# Patient Record
Sex: MALE | Race: WHITE
[De-identification: names, ages, dates, MRNs, and addresses within clinical notes are randomized per-mention and may not be internally consistent; named-entity substitution may affect disease eponyms.]

---

## 2017-01-10 ENCOUNTER — HOSPITAL ENCOUNTER (OUTPATIENT)
Dept: HOSPITAL 45 - C.LABMFLN | Age: 82
Discharge: HOME | End: 2017-01-10
Attending: FAMILY MEDICINE
Payer: COMMERCIAL

## 2017-01-10 DIAGNOSIS — Z00.00: Primary | ICD-10-CM

## 2017-01-10 LAB
APPEARANCE UR: CLEAR
BILIRUB UR-MCNC: (no result) MG/DL
COLOR UR: YELLOW
EOSINOPHIL NFR BLD AUTO: 182 K/UL (ref 130–400)
HCT VFR BLD CALC: 42.5 % (ref 42–52)
MANUAL MICROSCOPIC REQUIRED?: NO
MCH RBC QN AUTO: 28.3 PG (ref 25–34)
MCHC RBC AUTO-ENTMCNC: 32.7 G/DL (ref 32–36)
MCV RBC AUTO: 86.4 FL (ref 80–100)
NITRITE UR QL STRIP: (no result)
PH UR STRIP: 5.5 [PH] (ref 4.5–7.5)
PMV BLD AUTO: 11.1 FL (ref 7.4–10.4)
RBC # BLD AUTO: 4.92 M/UL (ref 4.7–6.1)
REVIEW REQ?: NO
SP GR UR STRIP: 1.02 (ref 1–1.03)
URINE BILL WITH OR WITHOUT MIC: (no result)
UROBILINOGEN UR-MCNC: (no result) MG/DL
WBC # BLD AUTO: 7.72 K/UL (ref 4.8–10.8)

## 2017-03-04 ENCOUNTER — HOSPITAL ENCOUNTER (EMERGENCY)
Dept: HOSPITAL 45 - C.EDB | Age: 82
Discharge: HOME | End: 2017-03-04
Payer: COMMERCIAL

## 2017-03-04 VITALS — TEMPERATURE: 97.88 F

## 2017-03-04 VITALS — SYSTOLIC BLOOD PRESSURE: 114 MMHG | OXYGEN SATURATION: 97 % | DIASTOLIC BLOOD PRESSURE: 70 MMHG | HEART RATE: 58 BPM

## 2017-03-04 VITALS
WEIGHT: 205.91 LBS | HEIGHT: 70 IN | BODY MASS INDEX: 29.48 KG/M2 | HEIGHT: 70 IN | WEIGHT: 205.91 LBS | BODY MASS INDEX: 29.48 KG/M2

## 2017-03-04 DIAGNOSIS — R10.11: Primary | ICD-10-CM

## 2017-03-04 DIAGNOSIS — M19.90: ICD-10-CM

## 2017-03-04 DIAGNOSIS — R12: ICD-10-CM

## 2017-03-04 LAB
ALP SERPL-CCNC: 65 U/L (ref 45–117)
ALT SERPL-CCNC: 46 U/L (ref 12–78)
ANION GAP SERPL CALC-SCNC: 8 MMOL/L (ref 3–11)
APPEARANCE UR: CLEAR
AST SERPL-CCNC: 25 U/L (ref 15–37)
BASOPHILS # BLD: 0.01 K/UL (ref 0–0.2)
BASOPHILS NFR BLD: 0.1 %
BILIRUB UR-MCNC: (no result) MG/DL
BUN SERPL-MCNC: 13 MG/DL (ref 7–18)
BUN/CREAT SERPL: 13.2 (ref 10–20)
CALCIUM SERPL-MCNC: 8.8 MG/DL (ref 8.5–10.1)
CHLORIDE SERPL-SCNC: 107 MMOL/L (ref 98–107)
CO2 SERPL-SCNC: 29 MMOL/L (ref 21–32)
COLOR UR: YELLOW
COMPLETE: YES
CREAT CL PREDICTED SERPL C-G-VRATE: 68.3 ML/MIN
CREAT SERPL-MCNC: 0.99 MG/DL (ref 0.6–1.4)
EOSINOPHIL NFR BLD AUTO: 214 K/UL (ref 130–400)
GLUCOSE SERPL-MCNC: 98 MG/DL (ref 70–99)
HCT VFR BLD CALC: 42.9 % (ref 42–52)
IG%: 0.1 %
IMM GRANULOCYTES NFR BLD AUTO: 32.5 %
INR PPP: 1 (ref 0.9–1.1)
LYMPHOCYTES # BLD: 2.22 K/UL (ref 1.2–3.4)
MANUAL MICROSCOPIC REQUIRED?: NO
MCH RBC QN AUTO: 27.2 PG (ref 25–34)
MCHC RBC AUTO-ENTMCNC: 31.7 G/DL (ref 32–36)
MCV RBC AUTO: 85.8 FL (ref 80–100)
MONOCYTES NFR BLD: 8.5 %
NEUTROPHILS # BLD AUTO: 1.8 %
NEUTROPHILS NFR BLD AUTO: 57 %
NITRITE UR QL STRIP: (no result)
PARTIAL THROMBOPLASTIN RATIO: 0.9
PH UR STRIP: 5.5 [PH] (ref 4.5–7.5)
PMV BLD AUTO: 9.9 FL (ref 7.4–10.4)
POTASSIUM SERPL-SCNC: 4 MMOL/L (ref 3.5–5.1)
PROTHROMBIN TIME: 10.2 SECONDS (ref 9–12)
RBC # BLD AUTO: 5 M/UL (ref 4.7–6.1)
REVIEW REQ?: NO
SODIUM SERPL-SCNC: 144 MMOL/L (ref 136–145)
SP GR UR STRIP: 1.02 (ref 1–1.03)
URINE BILL WITH OR WITHOUT MIC: (no result)
UROBILINOGEN UR-MCNC: (no result) MG/DL
WBC # BLD AUTO: 6.83 K/UL (ref 4.8–10.8)

## 2017-03-04 NOTE — EMERGENCY ROOM VISIT NOTE
History


Report prepared by Miguel:  Yaritza Zambrano


Under the Supervision of:  Dr. Kalen Salinas M.D.


First contact with patient:  11:41


Chief Complaint:  ABDOMINAL PAIN


Stated Complaint:  PAIN AND NAUSEA/STOMACH





History of Present Illness


The patient is an 81 year old male who presents to the Emergency Room with 

complaints of persistent mid abdominal pain that began last night around 

midnight.  He currently rates his discomfort as a 5/10 in severity, describing 

his discomfort as a pressure.  The patient states that he has had ongoing acid 

reflux problems for the past several years, but notes that his symptoms are 

much more severe recently.  He states that he takes Zantac and Protonix daily.  

The patient states that his pain is worsened with eating.  He additionally 

associates nausea and increased acid reflux with his symptoms.  The patient 

notes that for the last week and a half he has had loose, diarrhea stool.  He 

denies any recent antibiotic use.  The patient notes a family history of 

gallbladder disease, noting that his mother had a cholecystectomy in her 50s.  

He notes a history of a hernia repair, but denies any other abdominal 

surgeries.  The patient additionally notes that he has had a rash to the back 

of his legs.  The patient denies any chest pain, shortness of breath, fever 

vomiting, hematochezia, or melena.  He reports normal urination.





   Source of History:  patient


   Onset:  last night around midnight


   Position:  abdomen (mid)


   Symptom Intensity:  5/10


   Quality:  pressure


   Timing:  other (persistent)


   Modifying Factors (Worsening):  eating


   Associated Symptoms:  + nausea, + urinary symptoms (loose), No SOB, No chest 

pain, No fevers, No hematochezia, No melena, No vomiting


Note:


Associated Symptoms: increased acid reflux





Review of Systems


See HPI for pertinent positives & negatives. A total of 10 systems reviewed and 

were otherwise negative.





Past Medical & Surgical


Medical Problems:


(1) Arthritis


(2) Heart stents


(3) Kidney stones








Family History





Diabetes mellitus


Hypertension


Kidney disease


Kidney stones


Seizures





Social History


Smoking Status:  Never Smoker


Alcohol Use:  none


Drug Use:  none


Marital Status:  


Housing Status:  lives with significant other


Occupation Status:  retired





Current/Historical Medications


Scheduled


Amitriptyline HCl (Amitriptyline HCl), 25 MG PO HS


Aspirin (Aspirin 81), 81 MG PO QAM


Atorvastatin (Atorvastatin Calcium), 40 MG PO HS


Coenzyme Q10 (Ubidecarenone) (Co Q-10), 150 MG PO DAILY


Colchicine (Colchicine), 0.6 MG PO BID


Magnesium (Magnesium), 100 MG PO DAILY


Multiple Vitamins W/ Minerals (Multi For Him 50+), 1 TAB PO DAILY


Omega-3 Fatty Acids (Fish Oil), 1 CAP PO DAILY


Pantoprazole (Protonix), 40 MG PO BID


Propranolol Hcl (Inderal La), 60 MG PO QAM


Sucralfate (Carafate), 1 TAB PO TID


Zinc Gluconate (Zinc), 50 MG PO HS





Scheduled PRN


Butalbital-Aspirin-Caffeine (Butalbital/Aspirin/Caffei -40 mg), 1 CAP PO 

for Headache


Rizatriptan Benzoate (Maxalt), 10 MG PO UD PRN for Headache





Allergies


Coded Allergies:  


     No Known Allergies (Unverified , 4/5/15)





Physical Exam


Vital Signs











  Date Time  Temp Pulse Resp B/P Pulse Ox O2 Delivery O2 Flow Rate FiO2


 


3/4/17 16:05  58 18 114/70 97   


 


3/4/17 11:28 36.6 66 18 115/73 97 Room Air  











Physical Exam





Constitutional: Vital signs reviewed.


Eyes: Pupils are equal round reactive to light.  Conjunctiva are noninjected.  


ENT: Pharynx is clear without erythema or exudate.  Mucous membranes are moist.

  Neck supple without meningeal signs.


Respiratory: Clear to auscultation bilaterally.  Breath sounds are equal 

bilaterally. 


Cardiovascular: Regular rate and rhythm.  No rubs or gallops.


GI: Epigastric, right sided abdominal tenderness, no guarding.  Soft, 

nondistended.  Bowel sounds are present.


Musculoskeletal: No peripheral edema.  No lower extremity tenderness. 


Integumentary: Tinea cruris.


Neurological: The patient is awake and alert.  No focal deficits.


Psychiatric: Normal affect.





Medical Decision & Procedures


ER Provider


Diagnostic Interpretation:


Radiology results as stated below per my review and the radiologist's 

interpretation:





ULTRASOUND RIGHT UPPER QUADRANT ABDOMEN





CLINICAL HISTORY: Right upper quadrant abdominal pain.





COMPARISON STUDY: Abdominal ultrasound dated 9/30/2015. Abdominal CT dated


6/4/2015.





TECHNIQUE: Real-time, grayscale, and color flow sonography of the right upper


quadrant of the abdomen was performed. Images are reviewed in the transverse and


longitudinal planes.





FINDINGS:





Liver: The liver is normal in size and demonstrates heterogeneously increased


echotexture suggesting hepatic steatosis. There is no intrahepatic biliary


ductal dilatation. The main portal vein is patent.





Gallbladder: The gallbladder is normal in appearance. No gallstones are


identified. There is no gallbladder wall thickening or pericholecystic fluid. A


sonographic Bustamante's sign is reportedly absent. The common bile duct measures up


to 0.5 cm in diameter.





Pancreas: Visualized portions of the pancreatic head and body are normal in


appearance. The splenic vein is patent.





Right kidney: Survey images of the right kidney demonstrate cortical atrophy.


There is no hydronephrosis.





Ascites: None.








IMPRESSION: 





1. No acute sonographic abnormality is identified. No gallstones are seen.





2. Findings suggest mild hepatic steatosis.





Electronically signed by:  Nico Mendoza M.D.


3/4/2017 1:53 PM





Dictated Date/Time:  3/4/2017 1:51 PM








SINGLE VIEW CHEST





CLINICAL HISTORY:  Hiatal hernia. Esophageal reflux. Atypical chest pain.





FINDINGS: An AP, portable, upright chest radiograph is compared to study dated


9/30/2015. The examination is degraded by portable technique and patient


rotation. The cardiac silhouette is mildly enlarged and there is atherosclerotic


calcification of the thoracic aorta. The pulmonary vasculature is noncongested.


Chronic interstitial thickening is similar to previous. There is minimal


bibasilar atelectasis. No airspace consolidation, large pleural effusion, or


pneumothorax is seen. The skeletal structures are osteopenic. The bony thorax is


grossly intact. Degenerative change is noted throughout the thoracic spine.





IMPRESSION: Mild cardiac enlargement and chronic parenchymal changes as above.


There is no acute cardiopulmonary abnormality. 





Electronically signed by:  Nico Mendoza M.D.


3/4/2017 12:23 PM





Dictated Date/Time:  3/4/2017 12:22 PM





CT SCAN OF THE ABDOMEN AND PELVIS WITH IV CONTRAST





CLINICAL HISTORY:   Generalized abdominal pain. Nausea and diarrhea.





COMPARISON STUDY:  Abdominal CT dated 6/4/2015. Abdominal ultrasound dated


3/4/2017.





TECHNIQUE: Following the IV administration of  118 cc of Optiray 320, CT scan of


the abdomen and pelvis is performed from the lung bases to the proximal femora.


Images are reviewed in the axial, sagittal, and coronal planes. IV contrast was


administered without complication. Automated dose control exposure was utilized.





CT DOSE: 596.04 mGy.cm





FINDINGS:





Lung bases: The heart is enlarged and without pericardial effusion. There is a


calcified granuloma at the right lung base. The lung bases are otherwise clear


noting dependent atelectasis. A small to moderate hiatal hernia is noted.





Liver: The contrast-enhanced liver is normal in size, contour, and attenuation.


There is no intrahepatic biliary ductal dilatation. The hepatic veins and portal


veins are patent.





Gallbladder: Unremarkable.





Spleen: Normal in size and attenuation.





Pancreas: Unremarkable.





Adrenal glands: Unremarkable.





Kidneys: The contrast enhanced kidneys demonstrate cortical atrophy and are


without hydronephrosis. The kidneys enhance symmetrically. A 1.7 cm


indeterminant hypodensity in the left kidney seen on image #149 has not


significant changed from 6/4/2015. This was previously shown to represent a


complex/hyperdense cyst. Small simple cysts measure up to 1.6 cm. Additional


subcentimeter cortical hypodensities likely represent cysts but are too small


for definitive characterization. There is a retroaortic left renal vein.





Abdominal vasculature: The abdominal aorta is normal in course and caliber


noting moderate atherosclerotic calcification.





Bowel: The small bowel and colon are normal in course and caliber. The appendix


is  well-visualized and normal.





Peritoneum: There is no intraperitoneal free air or abdominal ascites. There is


a small fat-containing umbilical hernia.





Lymphadenopathy: None.





Pelvic viscera: The prostate gland is mildly enlarged and heterogeneous. The


bladder is partially decompressed and grossly unremarkable. There is a small


fat-containing left inguinal hernia. There is trace fluid in the pelvis.





Skeletal structures: The skeletal structures are osteopenic. Mild lumbosacral


spondylosis is observed. No lytic or blastic lesions are seen.








IMPRESSION:





1. There is trace nonspecific free fluid in the pelvis, likely reactive.





2. No additional infectious or inflammatory findings are identified in the


abdomen or pelvis.





3. Cardiomegaly and hiatal hernia.





4. Additional findings as above.





Electronically signed by:  Nico Mendoza M.D.


3/4/2017 3:12 PM





Dictated Date/Time:  3/4/2017 3:04 PM





Laboratory Results


3/4/17 12:15








Red Blood Count 5.00, Mean Corpuscular Volume 85.8, Mean Corpuscular Hemoglobin 

27.2, Mean Corpuscular Hemoglobin Concent 31.7, Mean Platelet Volume 9.9, 

Neutrophils (%) (Auto) 57.0, Lymphocytes (%) (Auto) 32.5, Monocytes (%) (Auto) 

8.5, Eosinophils (%) (Auto) 1.8, Basophils (%) (Auto) 0.1, Neutrophils # (Auto) 

3.89, Lymphocytes # (Auto) 2.22, Monocytes # (Auto) 0.58, Eosinophils # (Auto) 

0.12, Basophils # (Auto) 0.01





3/4/17 12:15

















Test


  3/4/17


12:15 3/4/17


14:00


 


White Blood Count


  6.83 K/uL


(4.8-10.8) 


 


 


Red Blood Count


  5.00 M/uL


(4.7-6.1) 


 


 


Hemoglobin


  13.6 g/dL


(14.0-18.0) 


 


 


Hematocrit 42.9 % (42-52)  


 


Mean Corpuscular Volume


  85.8 fL


() 


 


 


Mean Corpuscular Hemoglobin


  27.2 pg


(25-34) 


 


 


Mean Corpuscular Hemoglobin


Concent 31.7 g/dl


(32-36) 


 


 


Platelet Count


  214 K/uL


(130-400) 


 


 


Mean Platelet Volume


  9.9 fL


(7.4-10.4) 


 


 


Neutrophils (%) (Auto) 57.0 %  


 


Lymphocytes (%) (Auto) 32.5 %  


 


Monocytes (%) (Auto) 8.5 %  


 


Eosinophils (%) (Auto) 1.8 %  


 


Basophils (%) (Auto) 0.1 %  


 


Neutrophils # (Auto)


  3.89 K/uL


(1.4-6.5) 


 


 


Lymphocytes # (Auto)


  2.22 K/uL


(1.2-3.4) 


 


 


Monocytes # (Auto)


  0.58 K/uL


(0.11-0.59) 


 


 


Eosinophils # (Auto)


  0.12 K/uL


(0-0.5) 


 


 


Basophils # (Auto)


  0.01 K/uL


(0-0.2) 


 


 


RDW Standard Deviation


  41.3 fL


(36.4-46.3) 


 


 


RDW Coefficient of Variation


  13.2 %


(11.5-14.5) 


 


 


Immature Granulocyte % (Auto) 0.1 %  


 


Immature Granulocyte # (Auto)


  0.01 K/uL


(0.00-0.02) 


 


 


Prothrombin Time


  10.2 SECONDS


(9.0-12.0) 


 


 


Prothromb Time International


Ratio 1.0 (0.9-1.1) 


  


 


 


Activated Partial


Thromboplast Time 24.0 SECONDS


(21.0-31.0) 


 


 


Partial Thromboplastin Ratio 0.9  


 


Anion Gap


  8.0 mmol/L


(3-11) 


 


 


Est Creatinine Clear Calc


Drug Dose 68.3 ml/min 


  


 


 


Estimated GFR (


American) 83.0 


  


 


 


Estimated GFR (Non-


American 71.6 


  


 


 


BUN/Creatinine Ratio 13.2 (10-20)  


 


Calcium Level


  8.8 mg/dl


(8.5-10.1) 


 


 


Total Bilirubin


  0.3 mg/dl


(0.2-1) 


 


 


Direct Bilirubin


  < 0.1 mg/dl


(0-0.2) 


 


 


Aspartate Amino Transf


(AST/SGOT) 25 U/L (15-37) 


  


 


 


Alanine Aminotransferase


(ALT/SGPT) 46 U/L (12-78) 


  


 


 


Alkaline Phosphatase


  65 U/L


() 


 


 


Troponin I


  < 0.015 ng/ml


(0-0.045) 


 


 


Total Protein


  6.7 gm/dl


(6.4-8.2) 


 


 


Albumin


  3.3 gm/dl


(3.4-5.0) 


 


 


Lipase


  325 U/L


() 


 


 


Urine Color  YELLOW 


 


Urine Appearance  CLEAR (CLEAR) 


 


Urine pH  5.5 (4.5-7.5) 


 


Urine Specific Gravity


  


  1.018


(1.000-1.030)


 


Urine Protein  NEG (NEG) 


 


Urine Glucose (UA)  NEG (NEG) 


 


Urine Ketones  NEG (NEG) 


 


Urine Occult Blood  NEG (NEG) 


 


Urine Nitrite  NEG (NEG) 


 


Urine Bilirubin  NEG (NEG) 


 


Urine Urobilinogen  NEG (NEG) 


 


Urine Leukocyte Esterase  NEG (NEG) 








Laboratory results as reviewed by me.





Medications Administered











 Medications


  (Trade)  Dose


 Ordered  Sig/Adrienne


 Route  Start Time


 Stop Time Status Last Admin


Dose Admin


 


 Morphine Sulfate


  (MoRPHine


 SULFATE INJ)  4 mg  ONE  STAT


 IV  3/4/17 11:49


 3/4/17 11:53 DC 3/4/17 12:20


4 MG


 


 Ondansetron HCl


  (Zofran Inj)  4 mg  NOW  STAT


 IV  3/4/17 11:49


 3/4/17 11:53 DC 3/4/17 12:19


4 MG


 


 Sucralfate


  (Carafate Tab)  1 gm  NOW  ONCE


 PO  3/4/17 15:30


 3/4/17 15:31 DC 3/4/17 15:35


1 GM











ECG


Indication:  abdominal pain


Rate (beats per minute):  56


Rhythm:  sinus bradycardia


Findings:  no acute ischemic change, no ectopy





ED Course


1142: The patient was evaluated in room A11B. A complete history and physical 

exam was performed.





1149: Ordered Zofran Inj 4 mg IV, Morphine Sulfate 4 mg IV.





1354: I reevaluated the patient and he states that he feels better.  I 

discussed his blood tests with him at this time.





1530: I reevaluated the patient and he is doing fine.  I discussed the test 

results with him and his wife and I discussed the treatment plan.  I 

recommended close follow up, due to the cause of his pain being unclear.  He is 

going to be placed on Carafate for his reflux.  The patient denies any current 

pain or abdominal tenderness.  He verbalized complete understanding and 

agreement.  He is ready to go home.  Ordered Carafate Tab 1 gm PO.





Medical Decision


This is an 81-year-old male who presents with heartburn and abdominal pain with 

diarrhea.  Differential diagnosis includes GERD, cholelithiasis, cholecystitis, 

peptic ulcer disease, pancreatitis.  I did perform a limited focused review of 

portions of the patient's old chart on the electronic medical record. The 

patient has had no recent pertinent visits to this hospital.





I did evaluate the patient as noted above.  Patient is presenting with mid 

abdominal pain and has tenderness in the epigastric region as well as the right 

abdomen.  He does not have a Bustamante sign.  He also complains of reflux disease.

  He is on Protonix and Zantac and has had reflux for a long time.  He does 

state that it has been getting worse.  He is compliant with his medications.  

IV access was established.  I did treat patient with IV morphine and Zofran.  I 

did order and personally review the patient's 12-lead EKG and chest x-ray as 

described above.  I did order and review the patient's blood work as noted in 

the electronic medical record.  His white blood cell count is not elevated.  

LFTs and lipase are unremarkable.  Troponin is negative.  I did order an 

ultrasound of the right upper quadrant.  I did review the images myself as well 

as the radiology report as described above.  There is no evidence of 

gallbladder disease.  I did order a CT of the abdomen and pelvis after 

discussion with the patient.  The CAT scan did not show any evidence of acute 

process.  He did have some incidental findings which I discussed with him.  On 

reexamination the patient is not having any pain or tenderness.  He states he 

feels much better.  Urinalysis showed no evidence of infection.  The cause of 

his pain is unclear at this time and so I recommended close follow up with his 

doctor.  There is no indication for hospitalization currently.  He was given 

Carafate and discharged with a prescription for Carafate.  He was given return 

instructions as outlined below.





Impression





 Primary Impression:  


 Right upper quadrant abdominal pain


 Additional Impression:  


 Heart burn





Scribe Attestation


The scribe's documentation has been prepared under my direct and personally 

reviewed by me in its entirety. I confirm that the note above accurately 

reflects all work, treatment, procedures, and medical decision making performed 

by me.





Departure Information


Dispostion


Home / Self-Care





Prescriptions





Sucralfate (CARAFATE) 1 Gm Tab


1 TAB PO TID, #40 TAB 1 Refill


   Prov: Kalen Salinas M.D.         3/4/17





Referrals


No Doctor, Assigned (PCP)





Forms


HOME CARE DOCUMENTATION FORM,                                                 

               IMPORTANT VISIT INFORMATION





Patient Instructions


ED Abd Pain Unkn Cause Male, My [facility]





Additional Instructions





You have been examined and treated today on an emergency basis only. This is 

not a substitute for, or an effort to provide, complete comprehensive medical 

care. It is impossible to recognize and treat all injuries or illnesses in a 

single emergency department visit. It is therefore important that you follow up 

closely with your physician in 48 hours.  Call as soon as possible for an 

appointment.  Return for worsening symptoms or if you develop fever, vomiting, 

rectal bleeding, chest pain, shortness of breath or any other concerning 

symptoms.





Problem Qualifiers

## 2017-03-04 NOTE — DIAGNOSTIC IMAGING REPORT
SINGLE VIEW CHEST



CLINICAL HISTORY:  Hiatal hernia. Esophageal reflux. Atypical chest pain.



FINDINGS: An AP, portable, upright chest radiograph is compared to study dated

9/30/2015. The examination is degraded by portable technique and patient

rotation. The cardiac silhouette is mildly enlarged and there is atherosclerotic

calcification of the thoracic aorta. The pulmonary vasculature is noncongested.

Chronic interstitial thickening is similar to previous. There is minimal

bibasilar atelectasis. No airspace consolidation, large pleural effusion, or

pneumothorax is seen. The skeletal structures are osteopenic. The bony thorax is

grossly intact. Degenerative change is noted throughout the thoracic spine.



IMPRESSION: Mild cardiac enlargement and chronic parenchymal changes as above.

There is no acute cardiopulmonary abnormality. 







Electronically signed by:  Nico Mendoza M.D.

3/4/2017 12:23 PM



Dictated Date/Time:  3/4/2017 12:22 PM

## 2017-03-04 NOTE — DIAGNOSTIC IMAGING REPORT
ULTRASOUND RIGHT UPPER QUADRANT ABDOMEN



CLINICAL HISTORY: Right upper quadrant abdominal pain.



COMPARISON STUDY: Abdominal ultrasound dated 9/30/2015. Abdominal CT dated

6/4/2015.



TECHNIQUE: Real-time, grayscale, and color flow sonography of the right upper

quadrant of the abdomen was performed. Images are reviewed in the transverse and

longitudinal planes.



FINDINGS:



Liver: The liver is normal in size and demonstrates heterogeneously increased

echotexture suggesting hepatic steatosis. There is no intrahepatic biliary

ductal dilatation. The main portal vein is patent.



Gallbladder: The gallbladder is normal in appearance. No gallstones are

identified. There is no gallbladder wall thickening or pericholecystic fluid. A

sonographic Bustamante's sign is reportedly absent. The common bile duct measures up

to 0.5 cm in diameter.



Pancreas: Visualized portions of the pancreatic head and body are normal in

appearance. The splenic vein is patent.



Right kidney: Survey images of the right kidney demonstrate cortical atrophy.

There is no hydronephrosis.



Ascites: None.





IMPRESSION: 



1. No acute sonographic abnormality is identified. No gallstones are seen.



2. Findings suggest mild hepatic steatosis.







Electronically signed by:  Nico Mendoza M.D.

3/4/2017 1:53 PM



Dictated Date/Time:  3/4/2017 1:51 PM

## 2017-03-04 NOTE — DIAGNOSTIC IMAGING REPORT
CT SCAN OF THE ABDOMEN AND PELVIS WITH IV CONTRAST



CLINICAL HISTORY:   Generalized abdominal pain. Nausea and diarrhea.



COMPARISON STUDY:  Abdominal CT dated 6/4/2015. Abdominal ultrasound dated

3/4/2017.



TECHNIQUE: Following the IV administration of  118 cc of Optiray 320, CT scan of

the abdomen and pelvis is performed from the lung bases to the proximal femora.

Images are reviewed in the axial, sagittal, and coronal planes. IV contrast was

administered without complication. Automated dose control exposure was utilized.



CT DOSE: 596.04 mGy.cm



FINDINGS:



Lung bases: The heart is enlarged and without pericardial effusion. There is a

calcified granuloma at the right lung base. The lung bases are otherwise clear

noting dependent atelectasis. A small to moderate hiatal hernia is noted.



Liver: The contrast-enhanced liver is normal in size, contour, and attenuation.

There is no intrahepatic biliary ductal dilatation. The hepatic veins and portal

veins are patent.



Gallbladder: Unremarkable.



Spleen: Normal in size and attenuation.



Pancreas: Unremarkable.



Adrenal glands: Unremarkable.



Kidneys: The contrast enhanced kidneys demonstrate cortical atrophy and are

without hydronephrosis. The kidneys enhance symmetrically. A 1.7 cm

indeterminant hypodensity in the left kidney seen on image #149 has not

significant changed from 6/4/2015. This was previously shown to represent a

complex/hyperdense cyst. Small simple cysts measure up to 1.6 cm. Additional

subcentimeter cortical hypodensities likely represent cysts but are too small

for definitive characterization. There is a retroaortic left renal vein.



Abdominal vasculature: The abdominal aorta is normal in course and caliber

noting moderate atherosclerotic calcification.



Bowel: The small bowel and colon are normal in course and caliber. The appendix

is  well-visualized and normal.



Peritoneum: There is no intraperitoneal free air or abdominal ascites. There is

a small fat-containing umbilical hernia.



Lymphadenopathy: None.



Pelvic viscera: The prostate gland is mildly enlarged and heterogeneous. The

bladder is partially decompressed and grossly unremarkable. There is a small

fat-containing left inguinal hernia. There is trace fluid in the pelvis.



Skeletal structures: The skeletal structures are osteopenic. Mild lumbosacral

spondylosis is observed. No lytic or blastic lesions are seen.





IMPRESSION:



1. There is trace nonspecific free fluid in the pelvis, likely reactive.



2. No additional infectious or inflammatory findings are identified in the

abdomen or pelvis.



3. Cardiomegaly and hiatal hernia.



4. Additional findings as above.







Electronically signed by:  Nico Mendoza M.D.

3/4/2017 3:12 PM



Dictated Date/Time:  3/4/2017 3:04 PM

## 2017-04-20 ENCOUNTER — HOSPITAL ENCOUNTER (OUTPATIENT)
Dept: HOSPITAL 45 - C.LABMFLN | Age: 82
Discharge: HOME | End: 2017-04-20
Attending: FAMILY MEDICINE
Payer: COMMERCIAL

## 2017-04-20 DIAGNOSIS — E11.9: ICD-10-CM

## 2017-04-20 DIAGNOSIS — Z13.1: Primary | ICD-10-CM

## 2017-04-20 LAB
ANION GAP SERPL CALC-SCNC: 4 MMOL/L (ref 3–11)
BUN SERPL-MCNC: 14 MG/DL (ref 7–18)
BUN/CREAT SERPL: 12.8 (ref 10–20)
CALCIUM SERPL-MCNC: 9.3 MG/DL (ref 8.5–10.1)
CHLORIDE SERPL-SCNC: 108 MMOL/L (ref 98–107)
CO2 SERPL-SCNC: 32 MMOL/L (ref 21–32)
CREAT SERPL-MCNC: 1.1 MG/DL (ref 0.6–1.4)
EST. AVERAGE GLUCOSE BLD GHB EST-MCNC: 131 MG/DL
GLUCOSE SERPL-MCNC: 98 MG/DL (ref 70–99)
POTASSIUM SERPL-SCNC: 4.3 MMOL/L (ref 3.5–5.1)
SODIUM SERPL-SCNC: 144 MMOL/L (ref 136–145)

## 2017-04-24 NOTE — CODING QUERY MEDICAL NECESSITY
SUPPORTING DIAGNOSIS NEEDED





A supporting diagnosis is required for the test/procedure performed on this patient in 
order for us to be reimbursed by the patient's insurance. Please provide a supporting 
diagnosis for the following test/procedure listed below next to the test name along with 
your signature. 



*If there is no additional diagnosis for this patient that would support the following 
test/procedure please document that below next to the test/procedure.



Test(s)/Procedure(s) that require a supporting diagnosis:

DOS 4/20

* Hba1c      DIAGNOSIS:



Provider Signature:  ______________________________  Date:  _______



Thank you  

Laurel Kamara

Health Information Management

Phone:  [phone]

Fax:  [phone]



Once completed, please kindly fax back to [phone]



For questions please call [phone]

## 2017-05-01 ENCOUNTER — HOSPITAL ENCOUNTER (OUTPATIENT)
Dept: HOSPITAL 45 - C.CTS | Age: 82
Discharge: HOME | End: 2017-05-01
Attending: FAMILY MEDICINE
Payer: COMMERCIAL

## 2017-05-01 DIAGNOSIS — M54.16: Primary | ICD-10-CM

## 2017-05-01 NOTE — DIAGNOSTIC IMAGING REPORT
CT OF THE LUMBAR SPINE WITHOUT CONTRAST



CT DOSE: 940.61 mGy.cm



CLINICAL HISTORY: Back pain with radiculopathy.    



TECHNIQUE: Axial images of the lumbar spine were obtained without IV contrast.

Sagittal and coronal reconstructions were viewed.



COMPARISON STUDY:  Lumbar spine radiograph September 30, 2016.



FINDINGS: For purposes of numbering on this exam, the L5-S1 disc space is

assigned to axial image 334 of 410. Alignment of the lumbar spine is anatomic.

Vertebral body heights are maintained. There is no fracture or suspicious

lesion. A 1.5 cm hyperdense left renal cyst is better depicted on prior imaging

studies. No paravertebral abnormalities are identified. Central canal is

suboptimally assessed by CT but no epidural fluid collection is identified.

Central canal and neural foramen are suboptimally assessed by CT.



L1-L2: The central canal and neural foramen are patent.



L2-L3: The central canal and neural foramen are patent.



L3-L4: There is disc bulge with ligamentous hypertrophy and facet arthrosis.

There is suspected moderate narrowing of the central canal and lateral recesses

as well as the neural foramen.



L4-L5: There is disc space narrowing with vacuum disc phenomenon. There is

minimal narrowing of the central canal and mild narrowing of both neural

foramen.



L5-S1: The central canal is patent. There is facet arthrosis. There is mild disc

bulge. There is moderate narrowing of the left neural foramen.



IMPRESSION:  



1. No acute lumbar spine fracture or subluxation.



2. Mild multilevel degenerative disc disease and facet arthrosis, as described

above. Disc bulge with ligamentous hypertrophy and facet arthrosis at L3-L4

likely result in moderate narrowing of the central canal, lateral recesses and

neural foramen. 







Electronically signed by:  Taran Yu M.D.

5/1/2017 11:31 AM



Dictated Date/Time:  5/1/2017 11:13 AM

## 2017-05-16 ENCOUNTER — HOSPITAL ENCOUNTER (OUTPATIENT)
Dept: HOSPITAL 45 - C.GI | Age: 82
Discharge: HOME | End: 2017-05-16
Attending: INTERNAL MEDICINE
Payer: COMMERCIAL

## 2017-05-16 VITALS
HEIGHT: 70 IN | WEIGHT: 205.43 LBS | WEIGHT: 205.43 LBS | BODY MASS INDEX: 29.41 KG/M2 | HEIGHT: 70 IN | BODY MASS INDEX: 29.41 KG/M2

## 2017-05-16 VITALS — DIASTOLIC BLOOD PRESSURE: 82 MMHG | OXYGEN SATURATION: 97 % | SYSTOLIC BLOOD PRESSURE: 125 MMHG | HEART RATE: 64 BPM

## 2017-05-16 DIAGNOSIS — I10: ICD-10-CM

## 2017-05-16 DIAGNOSIS — K44.9: ICD-10-CM

## 2017-05-16 DIAGNOSIS — K29.70: ICD-10-CM

## 2017-05-16 DIAGNOSIS — I25.10: ICD-10-CM

## 2017-05-16 DIAGNOSIS — M19.90: ICD-10-CM

## 2017-05-16 DIAGNOSIS — K22.2: ICD-10-CM

## 2017-05-16 DIAGNOSIS — Z88.7: ICD-10-CM

## 2017-05-16 DIAGNOSIS — E78.00: ICD-10-CM

## 2017-05-16 DIAGNOSIS — Z98.890: ICD-10-CM

## 2017-05-16 DIAGNOSIS — K21.9: Primary | ICD-10-CM

## 2017-05-16 NOTE — ANESTHESIOLOGY PROGRESS NOTE
Anesthesia Post Op Note


Date & Time


May 16, 2017 at 10:31





Vital Signs


Pain Intensity:  0





 Vital Signs Past 12 Hours








  Date Time  Temp Pulse Resp B/P Pulse Ox O2 Delivery O2 Flow Rate FiO2


 


5/16/17 10:15  64 20 125/82 97 Room Air  


 


5/16/17 09:51  70 16 126/75 95 Room Air  


 


5/16/17 09:36  69 16 126/75 95 Room Air  


 


5/16/17 08:32 36.6 70 20 129/81 97 Room Air  











Notes


Mental Status:  alert / awake / arousable, participated in evaluation


Pt Amnestic to Procedure:  Yes


Nausea / Vomiting:  adequately controlled


Pain:  adequately controlled


Airway Patency, RR, SpO2:  stable & adequate


BP & HR:  stable & adequate


Hydration State:  stable & adequate


Anesthetic Complications:  no major complications apparent

## 2017-05-16 NOTE — DISCHARGE INSTRUCTIONS
Endoscopy Patient Instructions


Date / Procedure(s) Performed


May 16, 2017.


EGD





Allergy Information


Uncoded Allergies:  


     SHINGLES VACCINE (Allergy, Unknown, HIVES AND SORENESS AT INJECTION SITE, 5 /12/17)





Discharge Date / Findings


May 16, 2017.


Gastritis s/p biopsies


Hiatal hernia


Schatzki's Ring s/p dilation





Medication Instructions


Stopped Medication(s):  


ASPIRIN 81MG LAST DOSE 5/15/17





CARAFATE STOPPED 5/14/17 0900 AS DIRECTED


OK to resume all medications today as prescribed


 Reported Home Medications








 Medications  Dose


 Route/Sig


 Max Daily Dose Days Date Category


 


 Halcion


  (Triazolam) 0.25


 Mg Tab  0.25 Mg


 PO HS PRN


    5/12/17 Reported


 


 Magnesium 250 mg


  (Magnesium) 1 Tab


 Tab  1 Tab


 PO QAM


    5/12/17 Reported


 


 Vitamin D3


  (Cholecalciferol)


 2,000 Unit Tab  1 Tab


 PO QAM


    5/12/17 Reported


 


 Super B Complex


 Maxi (B-Complex


 W/ Folic Acid) 1


 Tab Tab  1 Tab


 PO QPM


    5/12/17 Reported


 


 Nitrostat


  (Nitroglycerin)


 0.4 Mg Sub  0.4 Mg


 UT PRN PRN


    5/12/17 Reported


 


 Ultracet


  (Tramadol/Acetaminophen)


 37.5 Mg/325 Mg Tab  1 Tab


 PO TID PRN


    5/12/17 Reported


 


 Carafate


  (Sucralfate) 1 Gm


 Tab  1 Gm


 PO QID


    5/12/17 Reported


 


 Zantac


  (Ranitidine HCl)


 150 Mg Tab  150 Mg


 PO BID


    5/12/17 Reported


 


 Protonix


  (Pantoprazole


 Sodium) 40 Mg Tab  40 Mg


 PO BID


    5/12/17 Reported


 


 Co Q-10 (Coenzyme


 Q10


  (Ubidecarenone))


 150 Mg Cap  100 Mg


 PO QAM


    3/4/17 Reported


 


 Maxalt


  (Rizatriptan


 Benzoate) 10 Mg


 Tab  10 Mg


 PO UD PRN


    9/30/15 Reported


 


 Zinc (Zinc


 Gluconate) 50 Mg


 Tab  50 Mg


 PO QPM


    4/2/15 Reported


 


 Inderal La


  (Propranolol Hcl)


 60 Mg Cap  60 Mg


 PO QAM


    4/2/15 Reported


 


 Butalbital/Aspirin/Caffei


 -40 mg


  (Butalbital-Aspirin-Caffeine)


 1 Cap Cap  1 Cap


 PO Q4-6H PRN


    4/2/15 Reported


 


 Atorvastatin


 Calcium


  (Atorvastatin) 40


 Mg Tab  40 Mg


 PO HS


    4/2/15 Reported


 


 Aspirin 81


  (Aspirin) 81 Mg


 Tab  81 Mg


 PO QAM


    4/2/15 Reported


 


 Amitriptyline HCl


 25 Mg Tab  25 Mg


 PO HS


    4/2/15 Reported











Provider Instructions





Activity Restrictions





-  No exercising or heavy lifting for 24 hours. 


-  Do not drink alcohol the day of the procedure.


-  Do not drive a car or operate machinery until the day after the procedure.


-  Do not make any important decisions or sign important papers in 24 hours 

after the procedure.





Following Day:





-  Return to full activity which may include returning to work/school.





Diet





Start your diet with liquids and light foods (jello, soup, juice, toast).  Then 

eat your usual diet if not nauseated.





Treatment For Common After Affects





For mild abdominal pain, bloating, or excessive gas:





-  Rest


-  Eat lightly


-  Lie on right side





Follow-Up Information


Follow-up with DR CHAUDHARY  as scheduled





Anesthesia Information





What You Should Know





You have had a procedure that required some medicine to reduce anxiety and 

discomfort. This treatment is called moderate sedation.  


After receiving the treatment, you may be sleepy, but you will be able to 

breathe on your own.  The effects of the treatment may last for several hours.








Follow these instructions along with Activity/Diet recommendations noted above:





*  Do NOT do anything where dizziness or clumsiness would be dangerous.





*  Rest quietly at home today, then you can be up and about tomorrow.





*  Have a responsible person stay with you the rest of today.





*  You may have had an I.V. today.  If so, you may take the dressing off later 

today.





Recommendations


 


Call your doctor if:





*  Trouble breathing 





*  Continuous vomiting for more than 24 hours





*  Temperature above 101 degrees





*  Severe abdominal pain or bloating





*  Pain not relieved by pain medicine ordered





*  There is increased drainage or redness from any incision





*  A large amount of rectal bleeding greater than 2-3 tablespoons. 


   (If you had a polyp/s removed or have hemorrhoids, a small amount of blood -


    from the rectum is to be expected.)





*  You have any unanswered questions or concerns.





IN THE EVENT OF A SERIOUS EMERGENCY, GO TO THE NEAREST EMERGENCY ROOM





       Your discharge instructions were prepared by provider Fox Huber.


 Patient Instructions Signature Page








Dion Lin 











Patient (or Guardian) Signature/Date:____________________________________ I 

have read and understand the instructions given to me by my caregivers.








Caregiver/RN/Doctor Signature/Date:____________________________________








The above-named patient and/or guardian has received patient instructions on 

this date.


























+  Original Patient Signature Page (only) stays with chart.  Please make copy 

for patient.

## 2017-05-16 NOTE — GI REPORT
Procedure Date: 5/16/2017 8:49 AM

Procedure:            Upper GI endoscopy

Indications:          Gastro-esophageal reflux disease

Medicines:            Monitored Anesthesia Care

Complications:        No immediate complications.

Estimated Blood Loss: Estimated blood loss: none.

Procedure:            Pre-Anesthesia Assessment:

                      - Prior to the procedure, a History and Physical was 

                      performed, and patient medications and allergies were 

                      reviewed. The patient's tolerance of previous 

                      anesthesia was also reviewed. The risks and benefits of 

                      the procedure and the sedation options and risks were 

                      discussed with the patient. All questions were 

                      answered, and informed consent was obtained. Prior 

                      Anticoagulants: The patient has taken aspirin, last 

                      dose was 1 day prior to procedure. ASA Grade 

                      Assessment: III - A patient with severe systemic 

                      disease. After reviewing the risks and benefits, the 

                      patient was deemed in satisfactory condition to undergo 

                      the procedure.

                      After obtaining informed consent, the endoscope was 

                      passed under direct vision. Throughout the procedure, 

                      the patient's blood pressure, pulse, and oxygen 

                      saturations were monitored continuously. The scope was 

                      introduced through the mouth, and advanced to the 

                      second part of duodenum. The upper GI endoscopy was 

                      accomplished without difficulty. The patient tolerated 

                      the procedure well.

Findings:

     A mild Schatzki ring (acquired) was found at the gastroesophageal 

     junction. A TTS dilator was passed through the scope. Dilation with an 

     18-19-20 mm balloon (to a maximum balloon size of 20 mm) dilator was 

     performed. The dilation site was examined and showed no change.

     A medium-sized hiatus hernia was present.

     Localized mild inflammation characterized by erythema was found in the 

     gastric antrum. Biopsies were taken with a cold forceps for histology.

     The examined duodenum was normal.

Impression:           - Mild Schatzki ring. Dilated.

                      - Medium-sized hiatus hernia.

                      - Gastritis. Biopsied.

                      - Normal examined duodenum.

Recommendation:       - Resume previous diet.

                      - Continue present medications.

                      - Await pathology results.

                      - Return to GI office as previously scheduled.

Fox Huber DO

5/16/2017 9:36:19 AM

This report has been signed electronically.

Note Initiated On: 5/16/2017 8:49 AM

     I attest to the content of the Intraoperative Record and orders 

     documented therein, exceptions below

## 2017-05-16 NOTE — ENDO HISTORY AND PHYSICAL
History & Physical


Date of Service:


May 16, 2017.


Chief Complaint:


GERD


Referring Physician:


DR CHAUDHARY


History of Present Illness


81 yo CM who presents for EGD secondary to severe GERD with worsening symptoms.





Past Medical History


Angioplasty/Stent, Arthritis, ASHD, Reflux, High Cholesterol, Heart Disease, 

Hypertension, Kidney Disease, Depression





Past Surgical History


Hx Cardiac Surgery:  Yes (HEART CATH, STENT X2)


Hx Internal Defibrillator:  No


Hx Pacemaker:  No


Hx Abdominal Surgery:  Yes (HERNIA REPAIR)


Hx of Implantable Prosthesis:  No


Hx Post-Op Nausea and Vomiting:  No


Hx Cancer Surgery:  No


Hx Thoracic Surgery:  No


Hx Orthopedic:  Yes (RT KNEE SCOPE, LT SHOULDER SURGERY)


Hx Urinary Tract Surgery:  Yes (URETAL STENTS, LITHOTRIPSY, AND ATTEMPT FOR 

REMOVAL)





Family History


None





Social History


Smoking Status:  Never Smoker


Hx Substance Use:  No


Hx Alcohol Use:  Yes (RARELY)





Allergies


Uncoded Allergies:  


     SHINGLES VACCINE (Allergy, Unknown, HIVES AND SORENESS AT INJECTION SITE, 5 /12/17)





Current Medications





 Reported Home Medications








 Medications  Dose


 Route/Sig


 Max Daily Dose Days Date Category


 


 Halcion


  (Triazolam) 0.25


 Mg Tab  0.25 Mg


 PO HS PRN


    5/12/17 Reported


 


 Magnesium 250 mg


  (Magnesium) 1 Tab


 Tab  1 Tab


 PO QAM


    5/12/17 Reported


 


 Vitamin D3


  (Cholecalciferol)


 2,000 Unit Tab  1 Tab


 PO QAM


    5/12/17 Reported


 


 Super B Complex


 Maxi (B-Complex


 W/ Folic Acid) 1


 Tab Tab  1 Tab


 PO QPM


    5/12/17 Reported


 


 Nitrostat


  (Nitroglycerin)


 0.4 Mg Sub  0.4 Mg


 UT PRN PRN


    5/12/17 Reported


 


 Ultracet


  (Tramadol/Acetaminophen)


 37.5 Mg/325 Mg Tab  1 Tab


 PO TID PRN


    5/12/17 Reported


 


 Carafate


  (Sucralfate) 1 Gm


 Tab  1 Gm


 PO QID


    5/12/17 Reported


 


 Zantac


  (Ranitidine HCl)


 150 Mg Tab  150 Mg


 PO BID


    5/12/17 Reported


 


 Protonix


  (Pantoprazole


 Sodium) 40 Mg Tab  40 Mg


 PO BID


    5/12/17 Reported


 


 Co Q-10 (Coenzyme


 Q10


  (Ubidecarenone))


 150 Mg Cap  100 Mg


 PO QAM


    3/4/17 Reported


 


 Maxalt


  (Rizatriptan


 Benzoate) 10 Mg


 Tab  10 Mg


 PO UD PRN


    9/30/15 Reported


 


 Zinc (Zinc


 Gluconate) 50 Mg


 Tab  50 Mg


 PO QPM


    4/2/15 Reported


 


 Inderal La


  (Propranolol Hcl)


 60 Mg Cap  60 Mg


 PO QAM


    4/2/15 Reported


 


 Butalbital/Aspirin/Caffei


 -40 mg


  (Butalbital-Aspirin-Caffeine)


 1 Cap Cap  1 Cap


 PO Q4-6H PRN


    4/2/15 Reported


 


 Atorvastatin


 Calcium


  (Atorvastatin) 40


 Mg Tab  40 Mg


 PO HS


    4/2/15 Reported


 


 Aspirin 81


  (Aspirin) 81 Mg


 Tab  81 Mg


 PO QAM


    4/2/15 Reported


 


 Amitriptyline HCl


 25 Mg Tab  25 Mg


 PO HS


    4/2/15 Reported











Vital Signs


Weight (Kilograms):  93.18


Height (Feet):  5


Height (Inches):  10











  Date Time  Temp Pulse Resp B/P Pulse Ox O2 Delivery O2 Flow Rate FiO2


 


5/16/17 08:32 36.6 70 20 129/81 97 Room Air  











Physical Exam


General Appearance:  WD/WN, no apparent distress


Respiratory/Chest:  


   Auscultation:  breath sounds normal


Cardiovascular:  


   Heart Auscultation:  RRR


Abdomen:  


   Bowel Sounds:  normal


   Inspection & Palpation:  soft, non-distended, no tenderness, guarding & 

rebound





Assessment and Plan


Assessment:


81 yo CM who presents for EGD secondary to severe GERD with worsening symptoms.








Plan:


Proceed with EGD.

## 2017-10-20 ENCOUNTER — HOSPITAL ENCOUNTER (OUTPATIENT)
Dept: HOSPITAL 45 - C.LABMFLN | Age: 82
Discharge: HOME | End: 2017-10-20
Attending: FAMILY MEDICINE
Payer: COMMERCIAL

## 2017-10-20 DIAGNOSIS — E78.00: ICD-10-CM

## 2017-10-20 DIAGNOSIS — M11.269: Primary | ICD-10-CM

## 2017-10-20 LAB
ALP SERPL-CCNC: 60 U/L (ref 45–117)
ALT SERPL-CCNC: 33 U/L (ref 12–78)
CHOLEST/HDLC SERPL: 3.6 {RATIO}
CRP SERPL-MCNC: 0.3 MG/DL (ref 0–0.29)
FERRITIN SERPL-MCNC: 57.8 NG/ML (ref 8–388)
GLUCOSE UR QL: 41 MG/DL
NITRITE UR QL STRIP: 128 MG/DL (ref 0–150)
PH UR: 148 MG/DL (ref 0–200)
URATE SERPL-MCNC: 6.5 MG/DL (ref 2.6–7.2)
VERY LOW DENSITY LIPOPROT CALC: 26 MG/DL

## 2017-12-17 ENCOUNTER — HOSPITAL ENCOUNTER (OUTPATIENT)
Dept: HOSPITAL 45 - C.EDB | Age: 82
Setting detail: OBSERVATION
LOS: 2 days | Discharge: HOME | End: 2017-12-19
Attending: HOSPITALIST | Admitting: HOSPITALIST
Payer: COMMERCIAL

## 2017-12-17 VITALS
DIASTOLIC BLOOD PRESSURE: 77 MMHG | OXYGEN SATURATION: 96 % | SYSTOLIC BLOOD PRESSURE: 139 MMHG | TEMPERATURE: 98.24 F | HEART RATE: 60 BPM

## 2017-12-17 VITALS
BODY MASS INDEX: 29.04 KG/M2 | HEIGHT: 70 IN | WEIGHT: 202.83 LBS | BODY MASS INDEX: 29.04 KG/M2 | HEIGHT: 70 IN | WEIGHT: 202.83 LBS

## 2017-12-17 DIAGNOSIS — Z95.5: ICD-10-CM

## 2017-12-17 DIAGNOSIS — I77.72: ICD-10-CM

## 2017-12-17 DIAGNOSIS — G89.29: ICD-10-CM

## 2017-12-17 DIAGNOSIS — N40.0: ICD-10-CM

## 2017-12-17 DIAGNOSIS — R10.13: ICD-10-CM

## 2017-12-17 DIAGNOSIS — R59.0: ICD-10-CM

## 2017-12-17 DIAGNOSIS — Z79.899: ICD-10-CM

## 2017-12-17 DIAGNOSIS — R07.89: Primary | ICD-10-CM

## 2017-12-17 DIAGNOSIS — I25.10: ICD-10-CM

## 2017-12-17 DIAGNOSIS — K21.9: ICD-10-CM

## 2017-12-17 DIAGNOSIS — M19.90: ICD-10-CM

## 2017-12-17 DIAGNOSIS — E78.5: ICD-10-CM

## 2017-12-17 DIAGNOSIS — Z79.82: ICD-10-CM

## 2017-12-17 DIAGNOSIS — M06.9: ICD-10-CM

## 2017-12-17 LAB
ALP SERPL-CCNC: 65 U/L (ref 45–117)
ALT SERPL-CCNC: 32 U/L (ref 12–78)
ANION GAP SERPL CALC-SCNC: 7 MMOL/L (ref 3–11)
APPEARANCE UR: CLEAR
AST SERPL-CCNC: 22 U/L (ref 15–37)
BASOPHILS # BLD: 0.02 K/UL (ref 0–0.2)
BASOPHILS NFR BLD: 0.4 %
BILIRUB UR-MCNC: (no result) MG/DL
BUN SERPL-MCNC: 16 MG/DL (ref 7–18)
BUN/CREAT SERPL: 14.1 (ref 10–20)
CALCIUM SERPL-MCNC: 8.7 MG/DL (ref 8.5–10.1)
CHLORIDE SERPL-SCNC: 109 MMOL/L (ref 98–107)
CO2 SERPL-SCNC: 28 MMOL/L (ref 21–32)
COLOR UR: YELLOW
COMPLETE: YES
CREAT CL PREDICTED SERPL C-G-VRATE: 60.3 ML/MIN
CREAT SERPL-MCNC: 1.1 MG/DL (ref 0.6–1.4)
EOSINOPHIL NFR BLD AUTO: 175 K/UL (ref 130–400)
GLUCOSE SERPL-MCNC: 119 MG/DL (ref 70–99)
HCT VFR BLD CALC: 41.3 % (ref 42–52)
IG%: 0 %
IMM GRANULOCYTES NFR BLD AUTO: 37.2 %
LYMPHOCYTES # BLD: 2.03 K/UL (ref 1.2–3.4)
MANUAL MICROSCOPIC REQUIRED?: NO
MCH RBC QN AUTO: 27.8 PG (ref 25–34)
MCHC RBC AUTO-ENTMCNC: 32 G/DL (ref 32–36)
MCV RBC AUTO: 86.9 FL (ref 80–100)
MONOCYTES NFR BLD: 7.3 %
NEUTROPHILS # BLD AUTO: 4.2 %
NEUTROPHILS NFR BLD AUTO: 50.9 %
NITRITE UR QL STRIP: (no result)
PH UR STRIP: 7.5 [PH] (ref 4.5–7.5)
PMV BLD AUTO: 10.6 FL (ref 7.4–10.4)
POTASSIUM SERPL-SCNC: 4.5 MMOL/L (ref 3.5–5.1)
RBC # BLD AUTO: 4.75 M/UL (ref 4.7–6.1)
REVIEW REQ?: NO
SODIUM SERPL-SCNC: 143 MMOL/L (ref 136–145)
SP GR UR STRIP: > 1.045 (ref 1–1.03)
URINE BILL WITH OR WITHOUT MIC: (no result)
UROBILINOGEN UR-MCNC: (no result) MG/DL
WBC # BLD AUTO: 5.45 K/UL (ref 4.8–10.8)
ZZUR CULT IF INDIC CLEAN CATCH: NO

## 2017-12-17 RX ADMIN — NITROGLYCERIN SCH INCH: 20 OINTMENT TOPICAL at 23:12

## 2017-12-17 RX ADMIN — ATORVASTATIN CALCIUM SCH MG: 40 TABLET, FILM COATED ORAL at 23:11

## 2017-12-17 RX ADMIN — ZOLPIDEM TARTRATE PRN MG: 5 TABLET, FILM COATED ORAL at 23:12

## 2017-12-17 NOTE — EMERGENCY ROOM VISIT NOTE
History


Report prepared by Miguel:  Robe Lopez


Under the Supervision of:  Dr. Sarath Lawrence M.D.


First contact with patient:  14:41


Chief Complaint:  BACK PAIN


Stated Complaint:  BACK PAIN





History of Present Illness


The patient is an 82 year old male who presents to the Emergency Room with 

complaints of constant severe back pain beginning an hour ago. The patient 

states that he began feeling abdominal pain yesterday afternoon. He notes that 

he went to a party yesterday afternoon, and ate a large meal and became 

nauseous and began experiencing pain. He reports that his pain lasted for about 

4 hours, but disappeared after he drank a little ginger ale. He states that his 

nausea persisted even after his abdominal pain stopped. He denies drinking any 

alcohol at the party. He notes that he still felt mildly nauseous this morning 

when he woke up. He reports that he took a nap today, but woke up an hour ago 

with severe pain in his back. The patient states that his pain does not feel 

like a tearing in his back.  He notes that he took a baby aspirin early today, 

and took 4 nitro tablets in the last hour with relief of his symptoms. He 

reports that he is still experiencing mild abdominal discomfort, as well as 

left shoulder and arm pain that is worse than usual. He denies any diarrhea, SOB

, and vomiting. The patient states that he has body pain and sleeping difficulty

, for which he takes daily hydrocodone and sleeping pills. He notes that he has 

had two stents placed. He rates his current pain as a 3-4/10, but notes that 

his initial pain was a 9-10/10.





   Source of History:  patient


   Onset:  an hour ago


   Position:  back


   Symptom Intensity:  originally 9-10/10, but down to a 3-4/10 with nitro


   Timing:  constant


   Modifying Factors (Relieving):  other (nitro tablets)


   Associated Symptoms:  + nausea, No SOB, No vomiting, No diarrhea


Note:


He also complains of left shoulder and arm pain, as well as abdominal 

discomfort.





Review of Systems


See HPI for pertinent positives and negatives.  A total of ten systems were 

reviewed and were otherwise negative.





Past Medical & Surgical


Medical Problems:


(1) Arthritis


(2) BPH (benign prostatic hyperplasia)


(3) Heart stents


(4) Kidney stones


(5) Pain in scapula


(6) Rheumatoid arthritis


Surgical Problems:


(1) History of heart artery stent








Family History





Diabetes mellitus


Hypertension


Kidney disease


Kidney stones


Seizures





Social History


Smoking Status:  Never Smoker


Alcohol Use:  none


Drug Use:  none


Marital Status:  


Housing Status:  lives with significant other


Occupation Status:  retired





Current/Historical Medications


Scheduled


Aspirin (Aspirin 81), 81 MG PO QAM


Atorvastatin (Atorvastatin Calcium), 40 MG PO HS


B-Complex W/ Folic Acid (Super B Complex Maxi), 1 TAB PO QPM


Cholecalciferol (Vitamin D3), 1 TAB PO QAM


Coenzyme Q10 (Ubidecarenone) (Co Q-10), 100 MG PO QAM


Magnesium (Magnesium 250 mg), 1 TAB PO QAM


Pantoprazole (Protonix), 40 MG PO DAILY


Ranitidine Hcl (Zantac), 150 MG PO DAILY


Zinc Gluconate (Zinc), 50 MG PO QPM





Scheduled PRN


Butalbital-Aspirin-Caffeine (Butalbital/Aspirin/Caffei -40 mg), 1 CAP PO 

Q4-6H PRN for Headache


Nitroglycerin (Nitrostat), 0.4 MG UT PRN PRN for Chest Pain


Rizatriptan Benzoate (Maxalt), 10 MG PO UD PRN for Headache


Triazolam (Halcion), 0.25 MG PO HS PRN for Sleep





Allergies


Uncoded Allergies:  


     SHINGLES VACCINE (Allergy, Unknown, HIVES AND SORENESS AT INJECTION SITE, 5 /12/17)





Physical Exam


Vital Signs











  Date Time  Temp Pulse Resp B/P (MAP) Pulse Ox O2 Delivery O2 Flow Rate FiO2


 


12/17/17 20:01    135/84    


 


12/17/17 20:00  60 23  99   


 


12/17/17 19:26  67 18 127/79 98 Room Air  


 


12/17/17 19:25  63      


 


12/17/17 18:38  63 20 121/78 100 Room Air  


 


12/17/17 17:16  62 26 112/70 99 Room Air  


 


12/17/17 16:49  59 19  100   


 


12/17/17 16:39    111/66    


 


12/17/17 15:49  58 20  98   


 


12/17/17 15:34  62 17  96   


 


12/17/17 15:22  63      


 


12/17/17 15:19  65 26  96   


 


12/17/17 15:08     96 Room Air  


 


12/17/17 14:36 36.8 80 18 110/70 96 Room Air  











Physical Exam


GENERAL: Awake, alert, uncomfortable-appearing, in no distress


HENT: Normocephalic, atraumatic. Oropharynx unremarkable. Dry mucous membranes.


EYES: Normal conjunctiva. Sclera non-icteric.


NECK: Supple. No nuchal rigidity. FROM. No JVD.


RESPIRATORY: Clear to auscultation.


CARDIAC: Regular rate, normal rhythm. Extremities warm and well perfused. 

Pulses equal.


ABDOMEN: Soft, non-distended. No tenderness to palpation. No rebound or 

guarding. No masses.


RECTAL: Deferred.


MUSCULOSKELETAL: Chest examination reveals no tenderness. The back is 

symmetrical on inspection without obvious abnormality. There is no CVA 

tenderness to palpation. No joint edema. 


LOWER EXTREMITIES: Calves are equal size bilaterally and non-tender. No edema. 

No discoloration. 


NEURO: Normal sensorium. No sensory or motor deficits noted. 


SKIN: No rash or jaundice noted.





Medical Decision & Procedures


ER Provider


Diagnostic Interpretation:


Radiology results as stated below per my review and radiologist interpretation: 





CHEST ONE VIEW PORTABLE





FINDINGS:


Atherosclerosis of aortic arch. Cardiac silhouette normal in size. Coronary


artery stents versus coronary artery calcification may be present. Mild


prominence of pulmonary vasculature. Improved aeration of the left lung base. No


focal infiltrate. No large effusion or pneumothorax. Osseous structures normal.


Upper abdomen normal.





IMPRESSION:


1.  Mild volume overload may be present. Otherwise no acute cardiopulmonary


disease.





Electronically signed by:  Joel Heredia M.D.


12/17/2017 3:06 PM





CHEST COMBO ANGIO DISSECTION





FINDINGS:





 topogram: Unremarkable.





Pulmonary vasculature:





The study is adequate for assessment of the aorta. Precontrast imaging


demonstrates no evidence of intramural hematoma or postsurgical change.


Postcontrast imaging demonstrates a three-vessel aortic arch with mild


atherosclerosis. Patent origins of the branch vessels. No evidence of acute


aortic injury. No filling defect within the pulmonary arteries to suggest


embolus. Main pulmonary artery is not enlarged. No flattening of the


interventricular septum. No intracardiac intracardiac filling defect. No reflux


of contrast into the hepatic veins. Mild coronary artery calcification. Normal


heart size.





Remaining chest:





On soft tissue windows, normal thyroid and thoracic inlet. Numerous prominent


subcentimeter mediastinal and bilateral hilar lymph nodes. No pericardial or


pleural effusion. Small hiatal hernia. Hepatic steatosis by liver density.





On lung windows, dependent opacities likely atelectasis. No other focal


infiltrate. Large airways patent.





On bone windows, mildly exaggerated thoracic kyphosis. Mild degenerative changes


of the thoracic spine. Osteopenia.





IMPRESSION:


1.  No evidence of acute aortic injury or pulmonary embolus. 





2.  Numerous prominent subcentimeter mediastinal and bilateral hilar lymph nodes


likely reactive.





3.  Hepatic steatosis.





4.  Small hiatal hernia.





5.  Osteopenia.





Electronically signed by:  Joel Heredia M.D.


12/17/2017 4:31 PM





ANGIO ABD/PELVIS WITH CONTRAST





FINDINGS:





 topogram: Unremarkable.





Lung bases: Minimal dependent changes likely atelectasis. Calcified granuloma


noted at the right lung base. Minimal bandlike opacity likely also atelectasis


at the right lower lobe. Normal heart size. No pericardial or pleural effusion. 





Liver: Normal morphology. Density consistent with hepatic steatosis. Accessory


left hepatic artery arising from the left gastric artery.





Biliary: No gross biliary ductal dilatation allowing for the phase of contrast.


Normal gallbladder.





Pancreas: Mild parenchymal atrophy.





Spleen: Normal.





Adrenal glands: Normal.





Kidneys and ureters: Multifocal hypodensities in the kidneys likely cysts, some


too small to characterize. Single right main renal artery with a widely patent


origin though with atherosclerosis. 2 left renal arteries also patent. Ureters


normal.





Bladder: Normal.





Pelvic organs: Prostate enlargement likely secondary to benign prostatic


hyperplasia.





Bowel: Normal appendix. No bowel obstruction. Small hiatal hernia.





Peritoneal cavity: No free fluid or intraperitoneal gas.





Lymph nodes: No enlarged lymph nodes in the abdomen or pelvis.





Vasculature: Atherosclerosis of the normal caliber abdominal aorta. Major branch


vessels are patent although mild stenosis of the celiac artery is noted 5 mm


from its origin. Calcified atherosclerotic plaque at the origin of the superior


mesenteric artery does not significantly narrow its lumen. The inferior


mesenteric artery is also widely patent. Beyond the aortic bifurcation, a


dissection flap or web is evident in the right external iliac artery (series 8


image [id number]). This is limited to the right external iliac artery and does not


extend proximally or distally.





Abdominal wall: Fat-containing left inguinal hernia.





Musculoskeletal: Degenerative changes of the spine. Degenerative changes of the


hips and sacroiliac joints. Osteopenia without evidence of a compression


deformity.





IMPRESSION:


1.  No evidence of aortic dissection or acute aortic injury. However, evidence


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


likely chronic.





2.  No acute intra-abdominal pathology.





3.  Prostatomegaly.





4.  Osteopenia without evidence of a compression deformity.





Electronically signed by:  Joel Heredia M.D.


12/17/2017 4:42 PM





Laboratory Results











Test


  12/17/17


15:01


 


Immature Granulocyte % (Auto) 0.0 % 


 


White Blood Count


  5.45 K/uL


(4.8-10.8)


 


Red Blood Count


  4.75 M/uL


(4.7-6.1)


 


Hemoglobin


  13.2 g/dL


(14.0-18.0)


 


Hematocrit 41.3 % (42-52) 


 


Mean Corpuscular Volume


  86.9 fL


()


 


Mean Corpuscular Hemoglobin


  27.8 pg


(25-34)


 


Mean Corpuscular Hemoglobin


Concent 32.0 g/dl


(32-36)


 


Platelet Count


  175 K/uL


(130-400)


 


Mean Platelet Volume


  10.6 fL


(7.4-10.4)


 


Neutrophils (%) (Auto) 50.9 % 


 


Lymphocytes (%) (Auto) 37.2 % 


 


Monocytes (%) (Auto) 7.3 % 


 


Eosinophils (%) (Auto) 4.2 % 


 


Basophils (%) (Auto) 0.4 % 


 


Neutrophils # (Auto)


  2.77 K/uL


(1.4-6.5)


 


Lymphocytes # (Auto)


  2.03 K/uL


(1.2-3.4)


 


Monocytes # (Auto)


  0.40 K/uL


(0.11-0.59)


 


Eosinophils # (Auto)


  0.23 K/uL


(0-0.5)


 


Basophils # (Auto)


  0.02 K/uL


(0-0.2)


 


Immature Granulocyte # (Auto)


  0.00 K/uL


(0.00-0.02)


 


Total Bilirubin


  0.3 mg/dl


(0.2-1)


 


Direct Bilirubin


  < 0.1 mg/dl


(0-0.2)


 


Aspartate Amino Transf


(AST/SGOT) 22 U/L (15-37) 


 


 


Alanine Aminotransferase


(ALT/SGPT) 32 U/L (12-78) 


 


 


Alkaline Phosphatase


  65 U/L


()


 


Pro-B-Type Natriuretic Peptide


  112 pg/ml


(0-1800)


 


Total Protein


  6.5 gm/dl


(6.4-8.2)


 


Albumin


  3.4 gm/dl


(3.4-5.0)


 


Lipase


  306 U/L


()





Laboratory results reviewed by me





Medications Administered











 Medications


  (Trade)  Dose


 Ordered  Sig/Adrienne


 Route  Start Time


 Stop Time Status Last Admin


Dose Admin


 


 Sodium Chloride  1,000 ml @ 


 999 mls/hr  Q1H1M STAT


 IV  12/17/17 14:44


 12/17/17 15:44 DC 12/17/17 15:21


999 MLS/HR


 


 Famotidine


  (Pepcid 20mg Iv


 Push)  20 mg  NOW  STAT


 IV  12/17/17 14:44


 12/17/17 14:52 DC 12/17/17 15:21


20 MG


 


 Ondansetron HCl


  (Zofran Inj)  4 mg  NOW  STAT


 IV  12/17/17 14:44


 12/17/17 14:52 DC 12/17/17 15:21


4 MG











ECG


Indication:  back/shoulder pain


Rate (beats per minute):  63


Rhythm:  normal sinus


Findings:  no acute ischemic change, other (Normal axis)





ED Course


1443: The patient was evaluated in room B2. A complete history and physical 

exam was performed.





1649: I paged the hospitalist.





1714: I reevaluated and updated the patient. 





1715: The secretaries confirmed that the hospitalist was aware of the patient.





2048: Upon reexamination, the patient was stable. I discussed the test results 

and treatment plan with him. The patient will be evaluated for further 

management.





Medical Decision


I reviewed the patient's past medical history, medications, and the nursing 

notes as described above.





Differential diagnoses include: ACS, PE, dissection, CHF, pneumonia, bronchitis

, gastritis, reflux, and peptic ulcer.





The patient is an 82-year-old gentleman with a past medical history of CAD 

status post stents who presents emergency Department with back pain that woke 

him up from a nap today in the setting of having epigastric pain with nausea 

that began yesterday per history of present illness.  Patient took 

nitroglycerin at home with some improvement in his back pain from an  8 to 3.  

EKG shows LAD but otherwise unremarkable.  CTA was done negative for acute 

dissection is a question of an iliac chronic dissection.  Troponin negative.  

PE not likely given not hypoxic or tachycardic. Otherwise, patient has a heart 

score of 5, moderate risk, thus admission for further ACS rule out is 

appropriate.  Patient will be admitted to the [facility] hospitalist service. Case d/

w Dr. Borden, who will admit the patient for further management.





Medication Reconcilliation


Current Medication List:  was personally reviewed by me





Blood Pressure Screening


Patient's blood pressure:  Normal blood pressure


Blood pressure disposition:  Did not require urgent referral





Consults


Time Called:  1647


Consulting Physician:  YANE Urena


Returned Call:  1649


I paged the hospitalist.


Additional Consults:  


   Time Called:  2046


   Consulted Physician:  YANE Vela


   Returned Call:  2048


Additional Comments:


I discussed the patient with YANE Vela. He will 

evaluate the patient for further treatment.





Impression





 Primary Impression:  


 Substernal chest pain





Scribe Attestation


The scribe's documentation has been prepared under my direction and personally 

reviewed by me in its entirety. I confirm that the note above accurately 

reflects all work, treatment, procedures, and medical decision making performed 

by me.





Departure Information


Dispostion


Being Evaluated By Hospitalist





Referrals


No Doctor, Assigned (PCP)





Patient Instructions


My [facility]

## 2017-12-17 NOTE — HISTORY AND PHYSICAL
History & Physical


Date & Time of Service:


Dec 17, 2017 at 20:30


Chief Complaint:


Back Pain


Primary Care Physician:


Joni Mckenna M.D.


History of Present Illness


Source:  patient, family, clinic records, hospital records


82M with a PMHx of cardiac stents in 2009, GERD p/w abdominal discomfort and 

scapular pain x 1 day.  Patient was recently at an [employer] party yesterday and 

shortly afterward felt nauseous and starting having abdominal pain in the 

epigastric area.  He was unable to sleep because of abdominal discomfort 

although he admits he does have chronic insomnia issues.  Today after [employer] he 

started having scapular pain as well.  He has a history of chronic joint and 

muscle aches that has tentatively been diagnosed as fibromyalgia but he has 

never had mid scapular pain as part of his symptoms.  He describe the scapular 

pain as sharp and stabbing.  His nausea and epigastric abdominal discomfort 

persisted to today.  His scapular pain went away after taking his 4th nitro 

tablets earlier this afternoon.  He admits he may have eaten something odd at 

[employer] however he denies any vomiting or changes in his bowel movements.  





According to Dr. Dale the patient admits to  getting abdominal pain 

after eating x many months and loose stools over this time period.  





PMHx: chronic pain, fibromyalgia, he has been self weaning from Opiate 

medications, has only taken 1 oxycodone in about two weeks.





Past Medical/Surgical History


Medical Problems:


(1) Arthritis


Status: Chronic  





(2) Heart stents


Status: Chronic  





(3) Kidney stones


Status: Resolved  











Family History





Diabetes mellitus


Hypertension


Kidney disease


Kidney stones


Seizures





Social History


Smoking Status:  Never Smoker


Smokeless Tobacco Use:  No


Alcohol Use:  none


Drug Use:  none


Marital Status:  


Housing status:  lives with family


Occupational Status:  retired





Immunizations


History of Influenza Vaccine:  Unknown


History of Tetanus Vaccine?:  Unknown


History of Pneumococcal:  Unknown


History of Hepatitis B Vaccine:  Unknown





Multi-Drug Resistant Organisms


History of MDRO:  No





Allergies


Uncoded Allergies:  


     SHINGLES VACCINE (Allergy, Unknown, HIVES AND SORENESS AT INJECTION SITE, 5 /12/17)





Home Medications


Scheduled


Aspirin (Aspirin 81), 81 MG PO QAM


Atorvastatin (Atorvastatin Calcium), 40 MG PO HS


B-Complex W/ Folic Acid (Super B Complex Maxi), 1 TAB PO QPM


Cholecalciferol (Vitamin D3), 1 TAB PO QAM


Coenzyme Q10 (Ubidecarenone) (Co Q-10), 100 MG PO QAM


Magnesium (Magnesium 250 mg), 1 TAB PO QAM


Pantoprazole (Protonix), 40 MG PO DAILY


Ranitidine Hcl (Zantac), 150 MG PO DAILY


Zinc Gluconate (Zinc), 50 MG PO QPM





Scheduled PRN


Butalbital-Aspirin-Caffeine (Butalbital/Aspirin/Caffei -40 mg), 1 CAP PO 

Q4-6H PRN for Headache


Nitroglycerin (Nitrostat), 0.4 MG UT PRN PRN for Chest Pain


Rizatriptan Benzoate (Maxalt), 10 MG PO UD PRN for Headache


Triazolam (Halcion), 0.25 MG PO HS PRN for Sleep





Review of Systems


Constitutional:  No fever, No chills


ENT:  No hearing loss


Respiratory:  No cough, No sputum, No wheezing, No shortness of breath, No 

dyspnea on exertion


Abdomen:  + pain, + nausea, No vomiting, No diarrhea, No constipation


Musculoskeletal:  + joint pain (chronic)


Genitourinary - Male:  No dysuria


Integumentary:  No rash





Physical Exam


Vital Signs











  Date Time  Temp Pulse Resp B/P (MAP) Pulse Ox O2 Delivery O2 Flow Rate FiO2


 


12/17/17 19:26  67 18 127/79 98 Room Air  


 


12/17/17 19:25  63      


 


12/17/17 18:38  63 20 121/78 100 Room Air  


 


12/17/17 17:16  62 26 112/70 99 Room Air  


 


12/17/17 16:49  59 19  100   


 


12/17/17 16:39    111/66    


 


12/17/17 15:49  58 20  98   


 


12/17/17 15:34  62 17  96   


 


12/17/17 15:22  63      


 


12/17/17 15:19  65 26  96   


 


12/17/17 15:08     96 Room Air  


 


12/17/17 14:36 36.8 80 18 110/70 96 Room Air  








General Appearance:  WD/WN, no apparent distress


Eyes:  PERRL


Neck:  supple


Respiratory/Chest:  chest non-tender, lungs clear, normal breath sounds, no 

respiratory distress, no accessory muscle use


Cardiovascular:  regular rate, rhythm, no edema, no gallop, no JVD, no murmur, 

normal peripheral pulses


Abdomen/GI:  normal bowel sounds, non tender, soft, no organomegaly


Extremities/Musculoskelatal:  normal inspection, no pedal edema, + pertinent 

finding (feet were cold but not purple, 2+ DP pulses bilaterally, no calf  

tenderness.      No tendeness in the scapular, shoulder exam was normal (no 

pain on passive ROM, pain when trying to put his arms above his head),  no pain 

on shoulder internal or external rotation against resistance.  )


Neurologic/Psych:  CNs II-XII nml as tested, no motor/sensory deficits, alert, 

normal mood/affect, oriented x 3





Diagnostics


Laboratory Results





Results Past 24 Hours








Test


  12/17/17


15:01 Range/Units


 


 


White Blood Count 5.45 4.8-10.8  K/uL


 


Red Blood Count 4.75 4.7-6.1  M/uL


 


Hemoglobin 13.2 14.0-18.0  g/dL


 


Hematocrit 41.3 42-52  %


 


Mean Corpuscular Volume 86.9   fL


 


Mean Corpuscular Hemoglobin 27.8 25-34  pg


 


Mean Corpuscular Hemoglobin


Concent 32.0


  32-36  g/dl


 


 


Platelet Count 175 130-400  K/uL


 


Mean Platelet Volume 10.6 7.4-10.4  fL


 


Neutrophils (%) (Auto) 50.9  %


 


Lymphocytes (%) (Auto) 37.2  %


 


Monocytes (%) (Auto) 7.3  %


 


Eosinophils (%) (Auto) 4.2  %


 


Basophils (%) (Auto) 0.4  %


 


Neutrophils # (Auto) 2.77 1.4-6.5  K/uL


 


Lymphocytes # (Auto) 2.03 1.2-3.4  K/uL


 


Monocytes # (Auto) 0.40 0.11-0.59  K/uL


 


Eosinophils # (Auto) 0.23 0-0.5  K/uL


 


Basophils # (Auto) 0.02 0-0.2  K/uL


 


RDW Standard Deviation 41.4 36.4-46.3  fL


 


RDW Coefficient of Variation 12.9 11.5-14.5  %


 


Immature Granulocyte % (Auto) 0.0  %


 


Immature Granulocyte # (Auto) 0.00 0.00-0.02  K/uL


 


Sodium Level 143 136-145  mmol/L


 


Potassium Level 4.5 3.5-5.1  mmol/L


 


Chloride Level 109   mmol/L


 


Carbon Dioxide Level 28 21-32  mmol/L


 


Anion Gap 7.0 3-11  mmol/L


 


Blood Urea Nitrogen 16 7-18  mg/dl


 


Creatinine


  1.10


  0.60-1.40


mg/dl


 


Est Creatinine Clear Calc


Drug Dose 60.3


   ml/min


 


 


Estimated GFR (


American) 72.6


   


 


 


Estimated GFR (Non-


American 62.6


   


 


 


BUN/Creatinine Ratio 14.1 10-20  


 


Random Glucose 119 70-99  mg/dl


 


Calcium Level 8.7 8.5-10.1  mg/dl


 


Total Bilirubin 0.3 0.2-1  mg/dl


 


Direct Bilirubin < 0.1 0-0.2  mg/dl


 


Aspartate Amino Transf


(AST/SGOT) 22


  15-37  U/L


 


 


Alanine Aminotransferase


(ALT/SGPT) 32


  12-78  U/L


 


 


Alkaline Phosphatase 65   U/L


 


Troponin I < 0.015 0-0.045  ng/ml


 


Pro-B-Type Natriuretic Peptide 112 0-1800  pg/ml


 


Total Protein 6.5 6.4-8.2  gm/dl


 


Albumin 3.4 3.4-5.0  gm/dl


 


Lipase 306   U/L











Diagnostic Radiology


ANGIO ABD/PELVIS WITH CONTRAST





CLINICAL HISTORY: 82 years-old Male presenting with back and abdominal pain,


clinical concern for aortic dissection. 





TECHNIQUE: Multidetector CT angiography of the abdomen and pelvis was performed


after the administration of intravenous contrast. 3-D volumetric and/or maximum


intensity projection (MIP) images were subsequently reconstructed for review. IV


contrast: 118 mL of Optiray 320. A dose lowering technique was used consistent


with the principles of ALARA (as low as reasonably achievable). Stenosis


measurements were based on NASCET-like criteria.





COMPARISON: 3/4/2017.





CT DOSE (mGy.cm): The estimated cumulative dose is 1536.19 mGy.cm.





FINDINGS:





 topogram: Unremarkable.





Lung bases: Minimal dependent changes likely atelectasis. Calcified granuloma


noted at the right lung base. Minimal bandlike opacity likely also atelectasis


at the right lower lobe. Normal heart size. No pericardial or pleural effusion. 





Liver: Normal morphology. Density consistent with hepatic steatosis. Accessory


left hepatic artery arising from the left gastric artery.





Biliary: No gross biliary ductal dilatation allowing for the phase of contrast.


Normal gallbladder.





Pancreas: Mild parenchymal atrophy.





Spleen: Normal.





Adrenal glands: Normal.





Kidneys and ureters: Multifocal hypodensities in the kidneys likely cysts, some


too small to characterize. Single right main renal artery with a widely patent


origin though with atherosclerosis. 2 left renal arteries also patent. Ureters


normal.





Bladder: Normal.





Pelvic organs: Prostate enlargement likely secondary to benign prostatic


hyperplasia.





Bowel: Normal appendix. No bowel obstruction. Small hiatal hernia.





Peritoneal cavity: No free fluid or intraperitoneal gas.





Lymph nodes: No enlarged lymph nodes in the abdomen or pelvis.





Vasculature: Atherosclerosis of the normal caliber abdominal aorta. Major branch


vessels are patent although mild stenosis of the celiac artery is noted 5 mm


from its origin. Calcified atherosclerotic plaque at the origin of the superior


mesenteric artery does not significantly narrow its lumen. The inferior


mesenteric artery is also widely patent. Beyond the aortic bifurcation, a


dissection flap or web is evident in the right external iliac artery (series 8


image 349). This is limited to the right external iliac artery and does not


extend proximally or distally.





Abdominal wall: Fat-containing left inguinal hernia.





Musculoskeletal: Degenerative changes of the spine. Degenerative changes of the


hips and sacroiliac joints. Osteopenia without evidence of a compression


deformity.





IMPRESSION:


1.  No evidence of aortic dissection or acute aortic injury. However, evidence


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


likely chronic.





2.  No acute intra-abdominal pathology.





3.  Prostatomegaly.





4.  Osteopenia without evidence of a compression deformity.





****************************************





CHEST COMBO ANGIO DISSECTION





CLINICAL HISTORY: 82 years-old Male presenting with 


^back and abd pain, r/o dissection. 





TECHNIQUE: Multidetector CT angiography of the chest was performed after


administration of intravenous contrast. 3-D volumetric and/or maximum intensity


projection (MIP) images were subsequently reconstructed for review. IV contrast:


118 mL of Optiray 320. A dose lowering technique was used consistent with the


principles of ALARA (as low as reasonably achievable).





COMPARISON: None.





CT DOSE (mGy.cm): The estimated cumulative dose is 1536.19 inclusive of the CTA


abdomen and pelvis.





FINDINGS:





 topogram: Unremarkable.





Pulmonary vasculature:





The study is adequate for assessment of the aorta. Precontrast imaging


demonstrates no evidence of intramural hematoma or postsurgical change.


Postcontrast imaging demonstrates a three-vessel aortic arch with mild


atherosclerosis. Patent origins of the branch vessels. No evidence of acute


aortic injury. No filling defect within the pulmonary arteries to suggest


embolus. Main pulmonary artery is not enlarged. No flattening of the


interventricular septum. No intracardiac intracardiac filling defect. No reflux


of contrast into the hepatic veins. Mild coronary artery calcification. Normal


heart size.





Remaining chest:





On soft tissue windows, normal thyroid and thoracic inlet. Numerous prominent


subcentimeter mediastinal and bilateral hilar lymph nodes. No pericardial or


pleural effusion. Small hiatal hernia. Hepatic steatosis by liver density.





On lung windows, dependent opacities likely atelectasis. No other focal


infiltrate. Large airways patent.





On bone windows, mildly exaggerated thoracic kyphosis. Mild degenerative changes


of the thoracic spine. Osteopenia.





IMPRESSION:


1.  No evidence of acute aortic injury or pulmonary embolus. 





2.  Numerous prominent subcentimeter mediastinal and bilateral hilar lymph nodes


likely reactive.





3.  Hepatic steatosis.





4.  Small hiatal hernia.





5.  Osteopenia.








************************************





CHEST ONE VIEW PORTABLE





CLINICAL HISTORY: 82 years-old Male presenting with CHEST PAIN. 





TECHNIQUE: Portable upright AP view of the chest was obtained.





COMPARISON: 3/4/2017.





FINDINGS:


Atherosclerosis of aortic arch. Cardiac silhouette normal in size. Coronary


artery stents versus coronary artery calcification may be present. Mild


prominence of pulmonary vasculature. Improved aeration of the left lung base. No


focal infiltrate. No large effusion or pneumothorax. Osseous structures normal.


Upper abdomen normal.





IMPRESSION:


1.  Mild volume overload may be present. Otherwise no acute cardiopulmonary


disease.





EKG


Normal sinus rhythm


Normal ECG


When compared with ECG of 04-MAR-2017 12:00,


No significant change was found





Impression


Assessment and Plan


82M p/w two day history of abdo pain and stabbing scapular pain.  





Substernal/Epigastric Pain +Scapular Pain with the history of history of CAD


- Trop neg x 1, BNP normal, CT shows no evidence of aortic dissection.  


- Per chart review h/o kidney stones but no CVA tenderness. 


- EKG reviewed, normal sinus rhythm


- Echo in the AM


- Daily EKGs.


- Admit to tele.  


- Trop neg x 1, will trend.


- c/w home ASA daily. 





Acute on Chronic Abdo pain in a history of loose stools. 


- History of RUQ pain after eating for many months suspicious of GB pathology.  


- Afebrile, WBC normal.  Abdomen non tender to palpation.  Hepatic Steatosis on 

CT.  


- LFTs WNL, Lipase WNL. 


- Will make NPO after midnight.


- follow up HIDA Scan





Chronic Right External Iliac Artery Dissection


- Noted on CT.  Made patient aware.  To be included in discharge instructions.  


- Monitor BP.   





Numerous prominent subcentimeter mediastinal and bilateral hilar lymph nodes 

likely reactive.


- Unsure where the reactive LN are coming from, lungs clear, afebrile, no WBC 

count, could be related to GB/


- Will get UA.  





HLD


-Continue Lipitor 40mg daily.





GERD


- c/w PPI +Ranitidine. 





H/o Migraines


Maxalt 10 MG PO UD PRN for Headache





Dispo: Tele, Obs, NPO after midnight for HIDA scan. 





DVT Proph: Hep SQ BID. 





FULL CODE








Attending addendum:








I have physically seen this patient, have supervised the medical residents 

activities, and agree with the H&P unless as otherwise noted.





Assessment and Plan:





Substernal/epigastric/intrascapular pain/CAD/coronary artery stents--


The patient will be admitted to telemetry for serial cardiac enzymes, cardiac 

rhythm monitoring and a 2-D echocardiogram with Dopplers.


Continue aspirin 81 mg every morning





Epigastric/right upper quadrant pain/loose stools for several months/hepatic 

steatosis on CT of abdomen and pelvis--


Nothing by mouth after midnight except meds


Continue pantoprazole 40 mg by mouth every morning and Ying Jose 150 mg by 

mouth daily.


Order HIDA scan with gallbladder ejection fraction.





Chronic right external iliac artery dissection/web--


No symptoms of claudication


She'll be monitored by outpatient physician





Bilateral mediastinal/hilar lymphadenopathy--


Suggestion of prior granulomatous infection


No active issues as far symptoms go at this time.





Migraine headaches--


Maxalt 10 mg daily when necessary





Hyperlipidemia--continue atorvastatin 40 mg by mouth bedtime








Level of Care


Telemetry





Advanced Directives


Existing Advance Directive:  No


Existing Living Will:  No


Existing Power of :  No





Resuscitation Status


FULL RESUSCITATION





VTE Prophylaxis


VTE Risk Assessment Done? Y/N:  Yes


Risk Level:  Moderate


Given or contraindicated:  SCD's


Resident Involvement:  Resident Care Provided


Care Provided:  Adult Hospital Medicine

## 2017-12-17 NOTE — DIAGNOSTIC IMAGING REPORT
CHEST COMBO ANGIO DISSECTION



CLINICAL HISTORY: 82 years-old Male presenting with 

^back and abd pain, r/o dissection. 



TECHNIQUE: Multidetector CT angiography of the chest was performed after

administration of intravenous contrast. 3-D volumetric and/or maximum intensity

projection (MIP) images were subsequently reconstructed for review. IV contrast:

118 mL of Optiray 320. A dose lowering technique was used consistent with the

principles of ALARA (as low as reasonably achievable).



COMPARISON: None.



CT DOSE (mGy.cm): The estimated cumulative dose is 1536.19 inclusive of the CTA

abdomen and pelvis.



FINDINGS:



 topogram: Unremarkable.



Pulmonary vasculature:



The study is adequate for assessment of the aorta. Precontrast imaging

demonstrates no evidence of intramural hematoma or postsurgical change.

Postcontrast imaging demonstrates a three-vessel aortic arch with mild

atherosclerosis. Patent origins of the branch vessels. No evidence of acute

aortic injury. No filling defect within the pulmonary arteries to suggest

embolus. Main pulmonary artery is not enlarged. No flattening of the

interventricular septum. No intracardiac intracardiac filling defect. No reflux

of contrast into the hepatic veins. Mild coronary artery calcification. Normal

heart size.



Remaining chest:



On soft tissue windows, normal thyroid and thoracic inlet. Numerous prominent

subcentimeter mediastinal and bilateral hilar lymph nodes. No pericardial or

pleural effusion. Small hiatal hernia. Hepatic steatosis by liver density.



On lung windows, dependent opacities likely atelectasis. No other focal

infiltrate. Large airways patent.



On bone windows, mildly exaggerated thoracic kyphosis. Mild degenerative changes

of the thoracic spine. Osteopenia.



IMPRESSION:

1.  No evidence of acute aortic injury or pulmonary embolus. 



2.  Numerous prominent subcentimeter mediastinal and bilateral hilar lymph nodes

likely reactive.



3.  Hepatic steatosis.



4.  Small hiatal hernia.



5.  Osteopenia.







Electronically signed by:  Joel Heredia M.D.

12/17/2017 4:31 PM



Dictated Date/Time:  12/17/2017 4:23 PM

## 2017-12-17 NOTE — DIAGNOSTIC IMAGING REPORT
CHEST ONE VIEW PORTABLE



CLINICAL HISTORY: 82 years-old Male presenting with CHEST PAIN. 



TECHNIQUE: Portable upright AP view of the chest was obtained.



COMPARISON: 3/4/2017.



FINDINGS:

Atherosclerosis of aortic arch. Cardiac silhouette normal in size. Coronary

artery stents versus coronary artery calcification may be present. Mild

prominence of pulmonary vasculature. Improved aeration of the left lung base. No

focal infiltrate. No large effusion or pneumothorax. Osseous structures normal.

Upper abdomen normal.



IMPRESSION:

1.  Mild volume overload may be present. Otherwise no acute cardiopulmonary

disease.







Electronically signed by:  Joel Heredia M.D.

12/17/2017 3:06 PM



Dictated Date/Time:  12/17/2017 3:05 PM

## 2017-12-17 NOTE — DIAGNOSTIC IMAGING REPORT
ANGIO ABD/PELVIS WITH CONTRAST



CLINICAL HISTORY: 82 years-old Male presenting with back and abdominal pain,

clinical concern for aortic dissection. 



TECHNIQUE: Multidetector CT angiography of the abdomen and pelvis was performed

after the administration of intravenous contrast. 3-D volumetric and/or maximum

intensity projection (MIP) images were subsequently reconstructed for review. IV

contrast: 118 mL of Optiray 320. A dose lowering technique was used consistent

with the principles of ALARA (as low as reasonably achievable). Stenosis

measurements were based on NASCET-like criteria.



COMPARISON: 3/4/2017.



CT DOSE (mGy.cm): The estimated cumulative dose is 1536.19 mGy.cm.



FINDINGS:



 topogram: Unremarkable.



Lung bases: Minimal dependent changes likely atelectasis. Calcified granuloma

noted at the right lung base. Minimal bandlike opacity likely also atelectasis

at the right lower lobe. Normal heart size. No pericardial or pleural effusion. 



Liver: Normal morphology. Density consistent with hepatic steatosis. Accessory

left hepatic artery arising from the left gastric artery.



Biliary: No gross biliary ductal dilatation allowing for the phase of contrast.

Normal gallbladder.



Pancreas: Mild parenchymal atrophy.



Spleen: Normal.



Adrenal glands: Normal.



Kidneys and ureters: Multifocal hypodensities in the kidneys likely cysts, some

too small to characterize. Single right main renal artery with a widely patent

origin though with atherosclerosis. 2 left renal arteries also patent. Ureters

normal.



Bladder: Normal.



Pelvic organs: Prostate enlargement likely secondary to benign prostatic

hyperplasia.



Bowel: Normal appendix. No bowel obstruction. Small hiatal hernia.



Peritoneal cavity: No free fluid or intraperitoneal gas.



Lymph nodes: No enlarged lymph nodes in the abdomen or pelvis.



Vasculature: Atherosclerosis of the normal caliber abdominal aorta. Major branch

vessels are patent although mild stenosis of the celiac artery is noted 5 mm

from its origin. Calcified atherosclerotic plaque at the origin of the superior

mesenteric artery does not significantly narrow its lumen. The inferior

mesenteric artery is also widely patent. Beyond the aortic bifurcation, a

dissection flap or web is evident in the right external iliac artery (series 8

image 349). This is limited to the right external iliac artery and does not

extend proximally or distally.



Abdominal wall: Fat-containing left inguinal hernia.



Musculoskeletal: Degenerative changes of the spine. Degenerative changes of the

hips and sacroiliac joints. Osteopenia without evidence of a compression

deformity.



IMPRESSION:

1.  No evidence of aortic dissection or acute aortic injury. However, evidence

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

likely chronic.



2.  No acute intra-abdominal pathology.



3.  Prostatomegaly.



4.  Osteopenia without evidence of a compression deformity.







Electronically signed by:  Joel Heredia M.D.

12/17/2017 4:42 PM



Dictated Date/Time:  12/17/2017 4:32 PM

## 2017-12-18 VITALS
SYSTOLIC BLOOD PRESSURE: 136 MMHG | OXYGEN SATURATION: 96 % | DIASTOLIC BLOOD PRESSURE: 62 MMHG | TEMPERATURE: 98.24 F | HEART RATE: 72 BPM

## 2017-12-18 VITALS
HEART RATE: 66 BPM | DIASTOLIC BLOOD PRESSURE: 80 MMHG | SYSTOLIC BLOOD PRESSURE: 142 MMHG | TEMPERATURE: 97.88 F | OXYGEN SATURATION: 95 %

## 2017-12-18 VITALS
HEART RATE: 59 BPM | OXYGEN SATURATION: 98 % | SYSTOLIC BLOOD PRESSURE: 116 MMHG | DIASTOLIC BLOOD PRESSURE: 63 MMHG | TEMPERATURE: 98.24 F

## 2017-12-18 VITALS
HEART RATE: 66 BPM | DIASTOLIC BLOOD PRESSURE: 67 MMHG | OXYGEN SATURATION: 96 % | SYSTOLIC BLOOD PRESSURE: 120 MMHG | TEMPERATURE: 97.34 F

## 2017-12-18 VITALS
OXYGEN SATURATION: 95 % | DIASTOLIC BLOOD PRESSURE: 77 MMHG | TEMPERATURE: 97.88 F | HEART RATE: 63 BPM | SYSTOLIC BLOOD PRESSURE: 132 MMHG

## 2017-12-18 VITALS
OXYGEN SATURATION: 94 % | DIASTOLIC BLOOD PRESSURE: 74 MMHG | TEMPERATURE: 97.88 F | HEART RATE: 63 BPM | SYSTOLIC BLOOD PRESSURE: 128 MMHG

## 2017-12-18 VITALS
OXYGEN SATURATION: 95 % | TEMPERATURE: 97.7 F | HEART RATE: 59 BPM | DIASTOLIC BLOOD PRESSURE: 82 MMHG | SYSTOLIC BLOOD PRESSURE: 149 MMHG

## 2017-12-18 LAB
ANION GAP SERPL CALC-SCNC: 3 MMOL/L (ref 3–11)
BUN SERPL-MCNC: 14 MG/DL (ref 7–18)
BUN/CREAT SERPL: 13.8 (ref 10–20)
CALCIUM SERPL-MCNC: 8.5 MG/DL (ref 8.5–10.1)
CHLORIDE SERPL-SCNC: 110 MMOL/L (ref 98–107)
CHOLEST/HDLC SERPL: 4.4 {RATIO}
CKMB/CK RATIO: 1.8 (ref 0–3)
CKMB/CK RATIO: 2.1 (ref 0–3)
CKMB/CK RATIO: 2.4 (ref 0–3)
CKMB/CK RATIO: 2.5 (ref 0–3)
CO2 SERPL-SCNC: 28 MMOL/L (ref 21–32)
CREAT CL PREDICTED SERPL C-G-VRATE: 66.6 ML/MIN
CREAT SERPL-MCNC: 1 MG/DL (ref 0.6–1.4)
EOSINOPHIL NFR BLD AUTO: 158 K/UL (ref 130–400)
GLUCOSE SERPL-MCNC: 93 MG/DL (ref 70–99)
GLUCOSE UR QL: 31 MG/DL
HCT VFR BLD CALC: 39.2 % (ref 42–52)
INR PPP: 1 (ref 0.9–1.1)
KETONES UR QL STRIP: 58 MG/DL
MAGNESIUM SERPL-MCNC: 2 MG/DL (ref 1.8–2.4)
MCH RBC QN AUTO: 27.6 PG (ref 25–34)
MCHC RBC AUTO-ENTMCNC: 32.1 G/DL (ref 32–36)
MCV RBC AUTO: 86 FL (ref 80–100)
NITRITE UR QL STRIP: 239 MG/DL (ref 0–150)
PARTIAL THROMBOPLASTIN RATIO: 0.8
PH UR: 137 MG/DL (ref 0–200)
PMV BLD AUTO: 10.1 FL (ref 7.4–10.4)
POTASSIUM SERPL-SCNC: 3.8 MMOL/L (ref 3.5–5.1)
PROTHROMBIN TIME: 10.4 SECONDS (ref 9–12)
RBC # BLD AUTO: 4.56 M/UL (ref 4.7–6.1)
SODIUM SERPL-SCNC: 141 MMOL/L (ref 136–145)
VERY LOW DENSITY LIPOPROT CALC: 48 MG/DL
WBC # BLD AUTO: 6.96 K/UL (ref 4.8–10.8)

## 2017-12-18 RX ADMIN — NITROGLYCERIN SCH INCH: 20 OINTMENT TOPICAL at 22:21

## 2017-12-18 RX ADMIN — NITROGLYCERIN SCH INCH: 20 OINTMENT TOPICAL at 10:19

## 2017-12-18 RX ADMIN — ATORVASTATIN CALCIUM SCH MG: 40 TABLET, FILM COATED ORAL at 20:32

## 2017-12-18 RX ADMIN — NITROGLYCERIN SCH INCH: 20 OINTMENT TOPICAL at 16:13

## 2017-12-18 RX ADMIN — RANITIDINE SCH MG: 150 TABLET ORAL at 10:17

## 2017-12-18 RX ADMIN — HEPARIN SODIUM SCH UNIT: 10000 INJECTION, SOLUTION INTRAVENOUS; SUBCUTANEOUS at 20:35

## 2017-12-18 RX ADMIN — Medication SCH MG: at 10:16

## 2017-12-18 RX ADMIN — NITROGLYCERIN SCH INCH: 20 OINTMENT TOPICAL at 04:37

## 2017-12-18 RX ADMIN — PANTOPRAZOLE SCH MG: 40 TABLET, DELAYED RELEASE ORAL at 10:16

## 2017-12-18 RX ADMIN — HEPARIN SODIUM SCH UNIT: 10000 INJECTION, SOLUTION INTRAVENOUS; SUBCUTANEOUS at 10:19

## 2017-12-18 RX ADMIN — ZOLPIDEM TARTRATE PRN MG: 5 TABLET, FILM COATED ORAL at 23:55

## 2017-12-18 NOTE — DOBUTAMINE ECHO
*NOTICE TO RECEIVING PARTY AGENCY**  This information is strictly Confidential and protected under 
[place] law.  [place] law prohibits you from making any further disclosure of this 
information unless further disclosure is expressly permitted by the written consent of the person to 
whom it pertains or is authorized by law.  A general authorization for the release of medical or 
other information is not sufficient for this purpose.  Hospital accepts no responsibility if the 
information is made available to any other person, INCLUDING THE PATIENT.



Interpretation Summary

  *  : OMAIRA ELIAS  Study Date: 2017 01:41 PM  BP: 149/65 mmHg

  *  MRN: [id number]  Patient Location: C.2T\S\S230\S\1  HR: 74

  *  : 1935 (M/d/y)  Gender: Male  Height: 70 in

  *  Age: 82 yrs  Ethnicity: [place]  Weight: 206 lb

  *  Ordering Physician: Clinton Max

  *  Referring Physician: Self, Referred

  *  Performed By: Yaritza Solitario RDCS

  *  Accession# [id number]  Account# [id number]

  *  Reason For Study: CHEST PAIN

  *  BSA: 2.1 m2

  *  -- Conclusions --

  *  1. Negative dobutamine stress echo for ischemia at 87% MPHR.

  *  2. Negative dobutamine stress ECG for ischemia.

  *  3. Normal resting LV size and function.  See prior echo report from earlier today for full 
details of resting function.

Procedure Details

  *  DOBUTAMINE ECHO, CPT#13130

Left Ventricular Findings with Stress

  *  This was essentially a normal study.

Stress Parameters

  *  Normal baseline electrocardiogram.

  *  Stress ECG: No ST changes.  No arrhythmias.

  *  Rest heart rate was '74' BPM.

  *  Rest blood pressure was '149/65'

  *  Maximum heart rate achieved was 121 bpm.

  *  Maximum heart rate was 87 % of maximum age-predicted heart rate.

  *  Maximum blood pressure was '160/35'

  *  Maximum Dobutamine infusion rate was '50' mcg/kg/min.

  *  Dobutamine infusion was terminated due to achieving target heart rate

  *  A total of 5 mg of IV Metoprolol was administered to reverse Dobutamine-induced tachycardia.

Left Ventricular Findings with Stress

  *  The study was technically good with many images being of high quality.

## 2017-12-18 NOTE — DIAGNOSTIC IMAGING REPORT
NUCLEAR MEDICINE HEPATOBILIARY SCAN INCLUDING EJECTION FRACTION ANALYSIS



CLINICAL HISTORY: Right upper quadrant abdominal pain after eating    



COMPARISON STUDY:  No previous studies for comparison.



FINDINGS: The patient was injected with 5.5 mCi of technetium 99m Choletec.

Sequential anterior imaging was performed. The gallbladder was first visualized

on the 10 minute image. Hepatic excretion appeared unremarkable. At 1 hour, the

patient was administered 1.87 mcg of sincalide utilizing a 30 minute intravenous

infusion. The gallbladder ejection fraction was normal measuring 98%. There is

normal passage of activity into small bowel.



IMPRESSION:  Normal study. No evidence of cystic duct obstruction. Normal

gallbladder ejection fraction of 98%. 









Electronically signed by:  Pete Brito M.D.

12/18/2017 10:19 AM



Dictated Date/Time:  12/18/2017 10:03 AM

## 2017-12-18 NOTE — ECHOCARDIOGRAM REPORT
*NOTICE TO RECEIVING PARTY AGENCY**  This information is strictly Confidential and protected under 
[place] law.  [place] law prohibits you from making any further disclosure of this 
information unless further disclosure is expressly permitted by the written consent of the person to 
whom it pertains or is authorized by law.  A general authorization for the release of medical or 
other information is not sufficient for this purpose.  Hospital accepts no responsibility if the 
information is made available to any other person, INCLUDING THE PATIENT.



Interpretation Summary

  *  Name: OMAIRA ELIAS  Study Date: 2017 06:48 AM  BP: 136/62 mmHg

  *  MRN: [id number]  Patient Location: C.2T\S\S230\S\1  HR: 72

  *  : 1935 (M/d/yyy)  Gender: Male  Height: 70 in

  *  Age: 82 yrs  Ethnicity: [place]  Weight: 205 lb

  *  Ordering Physician: Terry Kaminski

  *  Referring Physician: Self, Referred

  *  Performed By: Yaritza Solitario RDCS

  *  Accession# [id number]  Account# [id number]

  *  Reason For Study: CHEST PAIN

  *  BSA: 2.1 m2

  *  -- Conclusions --

  *  1. Normal LV size.  Normal LV wall thickness.

  *  2. Normal LV systolic function.  LVEF 65-70%. No regional wall motion abnormalities.

  *  3. Normal RV size and function.

  *  4. Aortic valve sclerosis without stenosis.

  *  5. Mild mitral regurgitation.

  *  6. Compared with prior study on 10/1/2015: No significant change

Procedure Details

  *  A complete two-dimensional transthoracic echocardiogram was performed (2D, M-mode, Doppler and 
color flow Doppler).

Left Ventricle

  *  The left ventricle is grossly normal size.

  *  There is normal left ventricular wall thickness.

  *  Ejection Fraction = 65-70%.

  *  No regional wall motion abnormalities noted.

Right Ventricle

  *  The right ventricle is grossly normal size.

  *  The right ventricular systolic function is normal as assessed by tricuspid annular plane 
systolic excursion (TAPSE) (normal >1.5 cm).

Atria

  *  The left atrial size is normal.

  *  Right atrial size is normal.

  *  No ASD detected; PFO is not assessed.

Mitral Valve

  *  The mitral valve anatomy is normal.

  *  There is no mitral valve stenosis.

  *  There is mild mitral regurgitation.

Tricuspid Valve

  *  The tricuspid valve is not well visualized, but is grossly normal.

  *  There is no tricuspid stenosis.

  *  There is trace tricuspid regurgitation.

Aortic Valve

  *  The aortic valve is trileaflet.

  *  Aortic valve sclerosis mild, without significant aortic valvular stenosis.

  *  No hemodynamically significant valvular aortic stenosis.

  *  There is no significant aortic regurgitation.

Pulmonic Valve

  *  The pulmonary valve is inadequately visualized, but the Doppler data is adequate for 
interpretation.

  *  Pulmonic stenosis is absent.

  *  There is no significant pulmonary regurgitation.

Great Vessels

  *  The aortic root and proximal ascending aorta are normal sized.

Pericardium/Pleural

  *  There is no pericardial effusion.

Great Vessels

  *  IVC < 2.1, <50% change with respiration.  Est 8 mmHg.



MMode 2D Measurements and Calculations

IVSd 1.4 cm

IVSs 1.9 cm



LVIDd 3.5 cm

LVIDs 2.3 cm

LVPWd 1.6 cm

LVPWs 1.9 cm



IVS/LVPW 0.88 

FS 34.6 %

EDV(Teich) 50.5 ml

ESV(Teich) 17.8 ml

EF(Teich) 64.8 %



EDV(cubed) 42.4 ml

ESV(cubed) 11.9 ml

EF(cubed) 72.0 %

% IVS thick 39.1 %

% LVPW thick 22.3 %





LV mass(C)d 184.2 grams

LV mass(C)dI 87.3 grams/m\S\2

LV mass(C)s 177.9 grams

LV mass(C)sI 84.3 grams/m\S\2



SV(Teich) 32.7 ml

SI(Teich) 15.5 ml/m\S\2

SV(cubed) 30.6 ml

SI(cubed) 14.5 ml/m\S\2



Ao root diam 3.4 cm

Ao root area 9.1 cm\S\2

LA dimension 3.7 cm



LA/Ao 1.1 





LVAd ap4 30.7 cm\S\2

LVLd ap4 8.8 cm

EDV(MOD-sp4) 89.5 ml

EDV(sp4-el) 90.8 ml

LVAs ap4 16.3 cm\S\2

LVLs ap4 7.2 cm

ESV(MOD-sp4) 31.7 ml

ESV(sp4-el) 31.5 ml

EF(MOD-sp4) 64.7 %

EF(sp4-el) 65.3 %



LVAd ap2 28.9 cm\S\2

LVLd ap2 8.7 cm

EDV(MOD-sp2) 81.5 ml

EDV(sp2-el) 81.7 ml

LVAs ap2 15.1 cm\S\2

LVLs ap2 7.4 cm

ESV(MOD-sp2) 27.7 ml

ESV(sp2-el) 26.2 ml

EF(MOD-sp2) 66.0 %

EF(sp2-el) 67.9 %



LVLd %diff -1.44 %

EDV(MOD-bp) 86.0 ml

LVLs %diff 2.8 %

ESV(MOD-bp) 29.7 ml

EF(MOD-bp) 65.5 %



SV(MOD-sp4) 57.9 ml

SI(MOD-sp4) 27.4 ml/m\S\2





SV(MOD-sp2) 53.8 ml

SI(MOD-sp2) 25.5 ml/m\S\2



SV(MOD-bp) 56.3 ml

SI(MOD-bp) 26.7 ml/m\S\2



SV(sp4-el) 59.2 ml

SI(sp4-el) 28.1 ml/m\S\2



SV(sp2-el) 55.5 ml

SI(sp2-el) 26.3 ml/m\S\2







Doppler Measurements and Calculations

MV E max jennifer 85.6 cm/sec

MV A max jennifer 78.7 cm/sec



MV E/A 1.1 



MV dec time 0.18 sec



Ao V2 max 162.5 cm/sec

Ao max PG 10.6 mmHg

Ao max PG (full) 7.1 mmHg





LV V1 max PG 3.5 mmHg



LV V1 max 93.7 cm/sec



TR max jennifer 227.4 cm/sec

## 2017-12-18 NOTE — PROGRESS NOTE
Subjective


Date of Service:


Dec 18, 2017.


Subjective


Pt evaluation today including:  conversation w/ patient, conversation w/ family

, physical exam, chart review, lab review, review of studies, conversation w/ 

consultant, review of inpatient medication list


Voiding:  no voiding problems


No pain, tolerate diet,





Problem List


Medical Problems:


(1) Substernal chest pain


Status: Acute  








Review of Systems


Constitutional:  No fever, No chills, No sweats, No weight loss, No weakness, 

No fatigue, No problem reported


Eyes:  No worsening of vision, No eye pain, No redness, No discharge, No 

diplopia


ENT:  No hearing loss, No unusual epistaxis, No nasal symptoms, No sore throat, 

No tinnitus, No dental problems, No trouble swallowing


Respiratory:  No cough, No sputum, No wheezing, No shortness of breath, No 

dyspnea on exertion, No dyspnea at rest, No hemoptysis


Cardiac:  No chest pain, No orthopnea, No PND, No edema, No claudication, No 

palpitations


Abdomen:  No pain, No nausea, No vomiting, No diarrhea, No constipation


Musculoskeletal:  No joint pain, No muscle pain, No swelling, No calf pain


Male :  No dysuria, No urinary frequency, No incontinence, No nocturia more 

than once/night, No slowing stream, No hematuria


Neurologic:  No memory loss, No paralysis, No weakness, No numbness/tingling, 

No vertigo, No balance problems


Psychiatric:  No depression symptoms, No anhedonism, No anxiety, No insomnia, 

No substance abuse


Heme:  No abnormal bleeding/bruising, No clotting problems, No swollen lymph 

nodes, No night sweats


Endo:  No fatigue, No excessive thirst, No excessive urination


Skin:  No rash, No itch, No new/changing skin lesions, No color change, No 

bleeding





Objective


Vital Signs











  Date Time  Temp Pulse Resp B/P (MAP) Pulse Ox O2 Delivery O2 Flow Rate FiO2


 


12/18/17 12:00      Room Air  


 


12/18/17 12:00 36.5 59 18 149/82 (104) 95 Room Air  


 


12/18/17 08:00      Room Air  


 


12/18/17 07:41 36.8 72 18 136/62 (86) 96   


 


12/18/17 04:37 36.8 59 18 116/63 (80) 98 Room Air  


 


12/18/17 04:00      Room Air  


 


12/18/17 00:08 36.3 66 18 120/67 (84) 96 Room Air  


 


12/18/17 00:02      Room Air  


 


12/17/17 21:46 36.8 60 22 139/77 96 Room Air  


 


12/17/17 21:03 36.8 64 16 113/74 97   


 


12/17/17 21:00  61 16 113/74 97   


 


12/17/17 20:30  62 20  98   


 


12/17/17 20:01    135/84    


 


12/17/17 20:00  60 23  99   


 


12/17/17 19:26  67 18 127/79 98 Room Air  


 


12/17/17 19:25  63      


 


12/17/17 18:38  63 20 121/78 100 Room Air  


 


12/17/17 17:16  62 26 112/70 99 Room Air  


 


12/17/17 16:49  59 19  100   


 


12/17/17 16:39    111/66    


 


12/17/17 15:49  58 20  98   


 


12/17/17 15:34  62 17  96   


 


12/17/17 15:22  63      


 


12/17/17 15:19  65 26  96   


 


12/17/17 15:08     96 Room Air  


 


12/17/17 14:36 36.8 80 18 110/70 96 Room Air  











Physical Exam


General Appearance:  WD/WN, no apparent distress


Eyes:  normal inspection, PERRL, EOMI, sclerae normal


ENT:  normal ENT inspection, hearing grossly normal, pharynx normal


Neck:  supple, no adenopathy, thyroid normal, no JVD, no carotid bruits, 

trachea midline


Respiratory/Chest:  chest non-tender, normal breath sounds, no respiratory 

distress, no accessory muscle use, + decreased breath sounds


Cardiovascular:  regular rate, rhythm, no edema, no gallop, no JVD, no murmur


Abdomen:  normal bowel sounds, non tender, soft, no organomegaly, no pulsatile 

mass


Extremities:  normal range of motion, non-tender, normal inspection, no pedal 

edema, no calf tenderness, normal capillary refill, pelvis stable


Neurologic/Psychiatric:  CNs II-XII nml as tested, no motor/sensory deficits, 

alert, normal mood/affect, oriented x 3


Skin:  normal color, warm/dry, no rash


Lymphatic:  no adenopathy





Laboratory Results





Last 24 Hours








Test


  12/17/17


15:01 12/17/17


22:15 12/18/17


01:53 12/18/17


11:31


 


White Blood Count 5.45 K/uL   6.96 K/uL  


 


Red Blood Count 4.75 M/uL   4.56 M/uL  


 


Hemoglobin 13.2 g/dL   12.6 g/dL  


 


Hematocrit 41.3 %   39.2 %  


 


Mean Corpuscular Volume 86.9 fL   86.0 fL  


 


Mean Corpuscular Hemoglobin 27.8 pg   27.6 pg  


 


Mean Corpuscular Hemoglobin


Concent 32.0 g/dl 


  


  32.1 g/dl 


  


 


 


Platelet Count 175 K/uL   158 K/uL  


 


Mean Platelet Volume 10.6 fL   10.1 fL  


 


Neutrophils (%) (Auto) 50.9 %    


 


Lymphocytes (%) (Auto) 37.2 %    


 


Monocytes (%) (Auto) 7.3 %    


 


Eosinophils (%) (Auto) 4.2 %    


 


Basophils (%) (Auto) 0.4 %    


 


Neutrophils # (Auto) 2.77 K/uL    


 


Lymphocytes # (Auto) 2.03 K/uL    


 


Monocytes # (Auto) 0.40 K/uL    


 


Eosinophils # (Auto) 0.23 K/uL    


 


Basophils # (Auto) 0.02 K/uL    


 


RDW Standard Deviation 41.4 fL   40.9 fL  


 


RDW Coefficient of Variation 12.9 %   13.0 %  


 


Immature Granulocyte % (Auto) 0.0 %    


 


Immature Granulocyte # (Auto) 0.00 K/uL    


 


Sodium Level 143 mmol/L   141 mmol/L  


 


Potassium Level 4.5 mmol/L   3.8 mmol/L  


 


Chloride Level 109 mmol/L   110 mmol/L  


 


Carbon Dioxide Level 28 mmol/L   28 mmol/L  


 


Anion Gap 7.0 mmol/L   3.0 mmol/L  


 


Blood Urea Nitrogen 16 mg/dl   14 mg/dl  


 


Creatinine 1.10 mg/dl   1.00 mg/dl  


 


Est Creatinine Clear Calc


Drug Dose 60.3 ml/min 


  


  66.6 ml/min 


  


 


 


Estimated GFR (


American) 72.6 


  


  81.4 


  


 


 


Estimated GFR (Non-


American 62.6 


  


  70.3 


  


 


 


BUN/Creatinine Ratio 14.1   13.8  


 


Random Glucose 119 mg/dl   93 mg/dl  


 


Calcium Level 8.7 mg/dl   8.5 mg/dl  


 


Total Bilirubin 0.3 mg/dl    


 


Direct Bilirubin < 0.1 mg/dl    


 


Aspartate Amino Transf


(AST/SGOT) 22 U/L 


  


  


  


 


 


Alanine Aminotransferase


(ALT/SGPT) 32 U/L 


  


  


  


 


 


Alkaline Phosphatase 65 U/L    


 


Troponin I < 0.015 ng/ml   < 0.015 ng/ml  < 0.015 ng/ml 


 


Pro-B-Type Natriuretic Peptide 112 pg/ml    


 


Total Protein 6.5 gm/dl    


 


Albumin 3.4 gm/dl    


 


Lipase 306 U/L    


 


Urine Color  YELLOW   


 


Urine Appearance  CLEAR   


 


Urine pH  7.5   


 


Urine Specific Gravity  > 1.045   


 


Urine Protein  NEG   


 


Urine Glucose (UA)  NEG   


 


Urine Ketones  NEG   


 


Urine Occult Blood  NEG   


 


Urine Nitrite  NEG   


 


Urine Bilirubin  NEG   


 


Urine Urobilinogen  NEG   


 


Urine Leukocyte Esterase  NEG   


 


Prothrombin Time   10.4 SECONDS  


 


Prothromb Time International


Ratio 


  


  1.0 


  


 


 


Activated Partial


Thromboplast Time 


  


  21.0 SECONDS 


  


 


 


Partial Thromboplastin Ratio   0.8  


 


Magnesium Level   2.0 mg/dl  


 


Total Creatine Kinase   59 U/L  59 U/L 


 


Creatine Kinase MB   1.5 ng/ml  1.4 ng/ml 


 


Creatine Kinase MB Ratio   2.5  2.4 


 


Triglycerides Level   239 mg/dl  


 


Cholesterol Level   137 mg/dl  


 


HDL Cholesterol   31 mg/dl  


 


LDL Cholesterol, Calculated   58 mg/dl  


 


VLDL Cholesterol, Calculated   48 mg/dl  


 


Cholesterol/HDL Ratio   4.4  











Assessment and Plan


82M admitted on 12/17/2017 because of two day history of abdo pain and stabbing 

scapular pain.  





Substernal/Epigastric Pain +Scapular Pain with the history of history of CAD 

with 2 stents


Trop neg x 2, BNP normal, CT shows no evidence of aortic dissection, or acute 

pulmonary embolization


c/w home ASA daily, ordered a stress echo tomorrow to rule out ACS because of 

Patient has significant heart disease with 2 stents





Acute on Chronic Abdo pain in a history of loose stools. 


No leukocytosis, function was normal,   HIDA Scan is being done which is 

unremarkable





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  Made patient 

aware.  To be included in discharge instructions, advise him to follow-up with 

PCP about this





Numerous prominent subcentimeter mediastinal and bilateral hilar lymph nodes 

likely reactive, advise patient to follow-up with PCP





HLD


GERD


H/o Migraines


Above condition stable continue home medication





DVT prophylaxis is covered


Possible discharge home tomorrow afternoon negative stress test, will consult 

[facility] cardiologist if stress test positive


Continued [facility] stay due to:  multiple IV medications needed


Discharge planning:  home

## 2017-12-19 VITALS
SYSTOLIC BLOOD PRESSURE: 146 MMHG | OXYGEN SATURATION: 98 % | DIASTOLIC BLOOD PRESSURE: 74 MMHG | HEART RATE: 74 BPM | TEMPERATURE: 98.42 F

## 2017-12-19 VITALS
HEART RATE: 66 BPM | OXYGEN SATURATION: 98 % | DIASTOLIC BLOOD PRESSURE: 81 MMHG | SYSTOLIC BLOOD PRESSURE: 158 MMHG | TEMPERATURE: 98.42 F

## 2017-12-19 VITALS
DIASTOLIC BLOOD PRESSURE: 81 MMHG | HEART RATE: 66 BPM | OXYGEN SATURATION: 98 % | TEMPERATURE: 98.42 F | SYSTOLIC BLOOD PRESSURE: 158 MMHG

## 2017-12-19 VITALS
HEART RATE: 59 BPM | DIASTOLIC BLOOD PRESSURE: 75 MMHG | OXYGEN SATURATION: 99 % | SYSTOLIC BLOOD PRESSURE: 125 MMHG | TEMPERATURE: 97.7 F

## 2017-12-19 LAB
ANION GAP SERPL CALC-SCNC: 5 MMOL/L (ref 3–11)
BUN SERPL-MCNC: 17 MG/DL (ref 7–18)
BUN/CREAT SERPL: 16.3 (ref 10–20)
CALCIUM SERPL-MCNC: 8.9 MG/DL (ref 8.5–10.1)
CHLORIDE SERPL-SCNC: 106 MMOL/L (ref 98–107)
CO2 SERPL-SCNC: 30 MMOL/L (ref 21–32)
CREAT CL PREDICTED SERPL C-G-VRATE: 64.8 ML/MIN
CREAT SERPL-MCNC: 1.02 MG/DL (ref 0.6–1.4)
EOSINOPHIL NFR BLD AUTO: 184 K/UL (ref 130–400)
GLUCOSE SERPL-MCNC: 91 MG/DL (ref 70–99)
HCT VFR BLD CALC: 41.3 % (ref 42–52)
MAGNESIUM SERPL-MCNC: 2.1 MG/DL (ref 1.8–2.4)
MCH RBC QN AUTO: 27.4 PG (ref 25–34)
MCHC RBC AUTO-ENTMCNC: 32.2 G/DL (ref 32–36)
MCV RBC AUTO: 85 FL (ref 80–100)
PMV BLD AUTO: 10.9 FL (ref 7.4–10.4)
POTASSIUM SERPL-SCNC: 4 MMOL/L (ref 3.5–5.1)
RBC # BLD AUTO: 4.86 M/UL (ref 4.7–6.1)
SODIUM SERPL-SCNC: 141 MMOL/L (ref 136–145)
WBC # BLD AUTO: 7.02 K/UL (ref 4.8–10.8)

## 2017-12-19 RX ADMIN — Medication SCH MG: at 08:22

## 2017-12-19 RX ADMIN — RANITIDINE SCH MG: 150 TABLET ORAL at 08:22

## 2017-12-19 RX ADMIN — PANTOPRAZOLE SCH MG: 40 TABLET, DELAYED RELEASE ORAL at 08:22

## 2017-12-19 RX ADMIN — NITROGLYCERIN SCH INCH: 20 OINTMENT TOPICAL at 05:00

## 2017-12-19 RX ADMIN — HEPARIN SODIUM SCH UNIT: 10000 INJECTION, SOLUTION INTRAVENOUS; SUBCUTANEOUS at 09:00

## 2017-12-19 NOTE — DISCHARGE SUMMARY
Discharge Summary


Date of Service


Dec 19, 2017.





Discharge Summary


Admission Date:


Dec 17, 2017 at 20:24


Discharge Date:  Dec 19, 2017


Discharge Disposition:  Home


Principal Diagnosis:  atypical chest pain


Problems/Secondary Diagnoses:


Acute on Chronic Abdo pain , HIDA Scan is being done which is unremarkable


Chronic Right External Iliac Artery Dissection, 


Numerous prominent subcentimeter mediastinal and bilateral hilar lymph nodes 

likely reactive


Immunizations:  


   Have You Had Influenza Vaccine:  Unknown


   History of Tetanus Vaccine?:  Unknown


   History of Pneumococcal:  Unknown


   History of Hepatitis B Vaccine:  Unknown


Procedures:


No


Consultations:


No





Medication Reconciliation


Continued Medications:  


Aspirin (Aspirin 81) 81 Mg Tab


81 MG PO QAM





Atorvastatin (Atorvastatin Calcium) 40 Mg Tab


40 MG PO HS





B-Complex W/ Folic Acid (Super B Complex Maxi) 1 Tab Tab


1 TAB PO QPM





Butalbital-Aspirin-Caffeine (Butalbital/Aspirin/Caffei -40 mg) 1 Cap Cap


1 CAP PO Q4-6H PRN for Headache





Cholecalciferol (Vitamin D3) 2,000 Unit Tab


1 TAB PO QAM





Coenzyme Q10 (Ubidecarenone) (Co Q-10) 150 Mg Cap


100 MG PO QAM





Magnesium (Magnesium 250 mg) 1 Tab Tab


1 TAB PO QAM





Nitroglycerin (Nitrostat) 0.4 Mg Sub


0.4 MG UT PRN PRN for Chest Pain





Pantoprazole (Protonix) 40 Mg Tab


40 MG PO DAILY





Ranitidine Hcl (Zantac) 150 Mg Tab


150 MG PO DAILY





Rizatriptan Benzoate (Maxalt) 10 Mg Tab


10 MG PO UD PRN for Headache, TAB





Triazolam (Halcion) 0.25 Mg Tab


0.25 MG PO HS PRN for Sleep





Zinc Gluconate (Zinc) 50 Mg Tab


50 MG PO QPM











Discharge Exam


No more chest pain, up and walk, no complaint, no dyspnea on exertion, no 

exertional chest pain


Review of Systems:  


   Constitutional:  No fever, No chills, No sweats, No weight loss, No weakness

, No fatigue, No problem reported


   Eyes:  No worsening of vision, No eye pain, No redness, No discharge, No 

diplopia, No problem reported


   ENT:  No hearing loss, No unusual epistaxis, No nasal symptoms, No sore 

throat, No tinnitus, No dental problems, No trouble swallowing, No problem 

reported


   Respiratory:  No cough, No sputum, No wheezing, No shortness of breath, No 

dyspnea on exertion, No dyspnea at rest, No hemoptysis, No problem reported


   Cardiovascular:  No chest pain, No orthopnea, No PND, No edema, No 

claudication, No palpitations, No problem reported


   Abdomen:  No pain, No nausea, No vomiting, No diarrhea, No constipation, No 

GI bleeding, No problem reported


   Musculoskeletal:  No joint pain, No muscle pain, No swelling, No calf pain, 

No problem reported


   Genitourinary - Male:  No hematuria, No dysuria, No urinary frequency, No 

urinary urgency, No urinary hesitancy, No urinary retention, No urinary 

incontinence, No penile discharge, No lesions, No impotence, No problem reported


   Neurologic:  No memory loss, No paralysis, No weakness, No numbness/tingling

, No vertigo, No balance problems, No problem reported


   Psychiatric:  No depression symptoms, No anhedonism, No anxiety, No insomnia

, No substance abuse, No problem reported


   Endocrine:  No fatigue, No excessive thirst, No excessive urination, No 

problem reported


   Hematologic / Lymphatic:  No abnormal bleeding/bruising, No clotting problems

, No swollen lymph nodes, No night sweats, No problem reported


   Integumentary:  No rash, No itch, No new/changing skin lesions, No color 

change, No bleeding, No problem reported





Hospital Course


82M admitted on 12/17/2017 because of two day history of abdo pain and stabbing 

scapular pain.  





Substernal/Epigastric Pain +Scapular Pain with the history of history of CAD 

with 2 stents


Trop neg x 2, BNP normal, CT shows no evidence of aortic dissection, or acute 

pulmonary embolization


c/w home ASA daily, ordered a stress echo tomorrow to rule out ACS because of 

Patient has significant heart disease with 2 stents.


Stress echo was negative per report, therefore patient was having atypical 

chest pain





Acute on Chronic Abdo pain in a history of loose stools. 


No leukocytosis, function was normal,   HIDA Scan is being done which is 

unremarkable





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  Made patient 

aware.  To be included in discharge instructions, advise him to follow-up with 

PCP about this





Numerous prominent subcentimeter mediastinal and bilateral hilar lymph nodes 

likely reactive, advise patient to follow-up with PCP





HLD


GERD


H/o Migraines


Above condition stable continue home medication





DVT prophylaxis is covered


Possible discharge home tomorrow afternoon negative stress test, will consult 

[facility] cardiologist if stress test positive





Instructions / Follow-Up


you have Substernal/Epigastric Pain +Scapular Pain with  history of CAD with 2 

stents


so far , your pain likely is noncardiac related, however I recommend you to 

follow up with your PCP and your cardiologist, further evaluation if needed





You have Acute on Chronic Abdo pain in a history of loose stools. 


No leukocytosis, function was normal,   HIDA Scan is being done which is 

unremarkable





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   he need 

to to follow-up with PCP about this


You have Numerous prominent subcentimeter mediastinal and bilateral hilar lymph 

nodes likely reactive,  you need to to follow-up with PCP





- you need to follow up with your primary care physician in 1 week,


- take medication as instructed, never overdose or any misuse, or take with 

alcohol,   because misuse of medicine may  cause organ damage or death, call 

your primary care physician if have questions of medicaitons.


- call your primary care physician OR go to local emergency room if has any 

fever/chill, chest pain, shortness of breathing, nausea/vomiting/abdominal pain

, facial droop/slurry speech/local weakness, or if has any questions. 


- fall precaution


- diet as instructed


- you need to follow up with your subspecialist 


- you should understand that it is important to follow up the above instruction

, and "not following the above instruction" may cause delayed or missed care of 

your medical conditions which may cause permanent organ damage and even death.


Total Time Spent:  Less than 30 minutes


This includes examination of the patient, discharge planning, medication 

reconciliation, and communication with other providers.





Discharge Instructions


Please refer to the electronic Patient Visit Report (Discharge Instructions) 

for additional information.





Additional Copies To


Joni Mckenna M.D.

## 2017-12-19 NOTE — DISCHARGE INSTRUCTIONS
Discharge Instructions


Date of Service


Dec 19, 2017.





Admission


Reason for Admission:  Pain In Scapula





Discharge


Discharge Diagnosis / Problem:  atypical Substernal/Epigastric Pain has yovanny 

our ACS





Discharge Goals


Goal(s):  Decrease discomfort, Improve function, Increase independence, Improve 

disease control, Improve nutritional status, Learn about illness, Diagnostic 

testing, Therapeutic intervention, Prevent Disease Progression, Specific goals





Activity Recommendations


Activity Limitations:  resume your previous activity





.





Instructions / Follow-Up


Instructions / Follow-Up


you have Substernal/Epigastric Pain +Scapular Pain with  history of CAD with 2 

stents


so far , your pain likely is noncardiac related, however I recommend you to 

follow up with your PCP and your cardiologist, further evaluation if needed





You have Acute on Chronic Abdo pain in a history of loose stools. 


No leukocytosis, function was normal,   HIDA Scan is being done which is 

unremarkable





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   he need 

to to follow-up with PCP about this


You have Numerous prominent subcentimeter mediastinal and bilateral hilar lymph 

nodes likely reactive,  you need to to follow-up with PCP





- you need to follow up with your primary care physician in 1 week,


- take medication as instructed, never overdose or any misuse, or take with 

alcohol,   because misuse of medicine may  cause organ damage or death, call 

your primary care physician if have questions of medicaitons.


- call your primary care physician OR go to local emergency room if has any 

fever/chill, chest pain, shortness of breathing, nausea/vomiting/abdominal pain

, facial droop/slurry speech/local weakness, or if has any questions. 


- fall precaution


- diet as instructed


- you need to follow up with your subspecialist 


- you should understand that it is important to follow up the above instruction

, and "not following the above instruction" may cause delayed or missed care of 

your medical conditions which may cause permanent organ damage and even death.





Current Hospital Diet


Patient's current hospital diet: AHA Diet (Heart Healthy)





Discharge Diet


Recommended Diet:  AHA Diet (Heart Healthy)





Pending Studies


Studies pending at discharge:  no





Laboratory Results





Lipid Panel








Test


  12/18/17


01:53 Range/Units


 


 


Triglycerides Level 239 H 0-150  mg/dl


 


Cholesterol Level 137  0-200  mg/dl


 


HDL Cholesterol 31   mg/dl


 


Cholesterol/HDL Ratio 4.4   


 


LDL Cholesterol, Calculated 58   mg/dl











Medical Emergencies








.


Who to Call and When:





Medical Emergencies:  If at any time you feel your situation is an emergency, 

please call 911 immediately.





.





Non-Emergent Contact


Non-Emergency issues call your:  Primary Care Provider, Cardiologist





.


.








"Provider Documentation" section prepared by Clinton Max.








.





VTE Core Measure


Inpt VTE Proph given/why not?:  SCD's

## 2018-01-03 ENCOUNTER — HOSPITAL ENCOUNTER (OUTPATIENT)
Dept: HOSPITAL 45 - C.LABMFLN | Age: 83
Discharge: HOME | End: 2018-01-03
Attending: FAMILY MEDICINE
Payer: COMMERCIAL

## 2018-01-03 DIAGNOSIS — M81.0: Primary | ICD-10-CM

## 2018-01-04 ENCOUNTER — HOSPITAL ENCOUNTER (OUTPATIENT)
Dept: HOSPITAL 45 - C.LABMFLN | Age: 83
Discharge: HOME | End: 2018-01-04
Attending: FAMILY MEDICINE
Payer: COMMERCIAL

## 2018-01-04 DIAGNOSIS — R19.7: Primary | ICD-10-CM

## 2018-04-05 ENCOUNTER — HOSPITAL ENCOUNTER (OUTPATIENT)
Dept: HOSPITAL 45 - C.LABMFLN | Age: 83
Discharge: HOME | End: 2018-04-05
Attending: FAMILY MEDICINE
Payer: COMMERCIAL

## 2018-04-05 DIAGNOSIS — K29.00: Primary | ICD-10-CM

## 2018-04-05 LAB
ALBUMIN SERPL-MCNC: 3.6 GM/DL (ref 3.4–5)
ALP SERPL-CCNC: 58 U/L (ref 45–117)
ALT SERPL-CCNC: 41 U/L (ref 12–78)
AST SERPL-CCNC: 23 U/L (ref 15–37)
HCT VFR BLD CALC: 44.3 % (ref 42–52)
HGB BLD-MCNC: 14.3 G/DL (ref 14–18)
PROT SERPL-MCNC: 7.3 GM/DL (ref 6.4–8.2)

## 2018-07-25 ENCOUNTER — HOSPITAL ENCOUNTER (OUTPATIENT)
Dept: HOSPITAL 45 - C.LABMFLN | Age: 83
Discharge: HOME | End: 2018-07-25
Attending: UROLOGY
Payer: COMMERCIAL

## 2018-07-25 DIAGNOSIS — N40.1: Primary | ICD-10-CM
